# Patient Record
Sex: FEMALE | Race: WHITE | NOT HISPANIC OR LATINO | Employment: FULL TIME | URBAN - NONMETROPOLITAN AREA
[De-identification: names, ages, dates, MRNs, and addresses within clinical notes are randomized per-mention and may not be internally consistent; named-entity substitution may affect disease eponyms.]

---

## 2017-04-19 ENCOUNTER — OFFICE VISIT - GICH (OUTPATIENT)
Dept: FAMILY MEDICINE | Facility: OTHER | Age: 27
End: 2017-04-19

## 2017-04-19 ENCOUNTER — HISTORY (OUTPATIENT)
Dept: FAMILY MEDICINE | Facility: OTHER | Age: 27
End: 2017-04-19

## 2017-04-19 DIAGNOSIS — B97.89 OTHER VIRAL AGENTS AS THE CAUSE OF DISEASES CLASSIFIED ELSEWHERE: ICD-10-CM

## 2017-04-19 DIAGNOSIS — J06.9 ACUTE UPPER RESPIRATORY INFECTION: ICD-10-CM

## 2017-10-27 ENCOUNTER — OFFICE VISIT - GICH (OUTPATIENT)
Dept: CHIROPRACTIC MEDICINE | Facility: OTHER | Age: 27
End: 2017-10-27

## 2017-10-27 DIAGNOSIS — M25.552 PAIN IN LEFT HIP: ICD-10-CM

## 2017-10-27 DIAGNOSIS — M99.05 SEGMENTAL AND SOMATIC DYSFUNCTION OF PELVIC REGION: ICD-10-CM

## 2017-10-27 DIAGNOSIS — M54.50 LOW BACK PAIN: ICD-10-CM

## 2017-10-27 DIAGNOSIS — M99.01 SEGMENTAL AND SOMATIC DYSFUNCTION OF CERVICAL REGION: ICD-10-CM

## 2017-10-27 DIAGNOSIS — M54.2 CERVICALGIA: ICD-10-CM

## 2017-10-27 DIAGNOSIS — R42 DIZZINESS AND GIDDINESS: ICD-10-CM

## 2017-10-27 DIAGNOSIS — G44.209 TENSION-TYPE HEADACHE, NOT INTRACTABLE: ICD-10-CM

## 2017-10-27 DIAGNOSIS — M99.02 SEGMENTAL AND SOMATIC DYSFUNCTION OF THORACIC REGION: ICD-10-CM

## 2017-10-27 DIAGNOSIS — M54.6 PAIN IN THORACIC SPINE: ICD-10-CM

## 2017-11-02 ENCOUNTER — HISTORY (OUTPATIENT)
Dept: EMERGENCY MEDICINE | Facility: OTHER | Age: 27
End: 2017-11-02

## 2017-11-02 ENCOUNTER — OFFICE VISIT - GICH (OUTPATIENT)
Dept: CHIROPRACTIC MEDICINE | Facility: OTHER | Age: 27
End: 2017-11-02

## 2017-11-02 DIAGNOSIS — M21.42 ACQUIRED LEFT FLAT FOOT: ICD-10-CM

## 2017-11-02 DIAGNOSIS — M99.06 SEGMENTAL AND SOMATIC DYSFUNCTION OF LOWER EXTREMITY: ICD-10-CM

## 2017-11-02 DIAGNOSIS — M99.05 SEGMENTAL AND SOMATIC DYSFUNCTION OF PELVIC REGION: ICD-10-CM

## 2017-11-02 DIAGNOSIS — M54.6 PAIN IN THORACIC SPINE: ICD-10-CM

## 2017-11-02 DIAGNOSIS — M99.04 SEGMENTAL AND SOMATIC DYSFUNCTION OF SACRAL REGION: ICD-10-CM

## 2017-11-02 DIAGNOSIS — M54.50 LOW BACK PAIN: ICD-10-CM

## 2017-11-02 DIAGNOSIS — M21.41 ACQUIRED RIGHT FLAT FOOT: ICD-10-CM

## 2017-11-02 DIAGNOSIS — M99.02 SEGMENTAL AND SOMATIC DYSFUNCTION OF THORACIC REGION: ICD-10-CM

## 2017-11-02 DIAGNOSIS — M25.552 PAIN IN LEFT HIP: ICD-10-CM

## 2017-11-10 ENCOUNTER — OFFICE VISIT - GICH (OUTPATIENT)
Dept: CHIROPRACTIC MEDICINE | Facility: OTHER | Age: 27
End: 2017-11-10

## 2017-11-10 DIAGNOSIS — M54.6 PAIN IN THORACIC SPINE: ICD-10-CM

## 2017-11-10 DIAGNOSIS — M54.50 LOW BACK PAIN: ICD-10-CM

## 2017-11-10 DIAGNOSIS — M25.552 PAIN IN LEFT HIP: ICD-10-CM

## 2017-11-10 DIAGNOSIS — M99.05 SEGMENTAL AND SOMATIC DYSFUNCTION OF PELVIC REGION: ICD-10-CM

## 2017-11-10 DIAGNOSIS — M21.42 ACQUIRED LEFT FLAT FOOT: ICD-10-CM

## 2017-11-10 DIAGNOSIS — M99.02 SEGMENTAL AND SOMATIC DYSFUNCTION OF THORACIC REGION: ICD-10-CM

## 2017-11-10 DIAGNOSIS — M99.01 SEGMENTAL AND SOMATIC DYSFUNCTION OF CERVICAL REGION: ICD-10-CM

## 2017-11-10 DIAGNOSIS — M54.2 CERVICALGIA: ICD-10-CM

## 2017-11-10 DIAGNOSIS — M21.41 ACQUIRED RIGHT FLAT FOOT: ICD-10-CM

## 2017-11-10 DIAGNOSIS — M99.06 SEGMENTAL AND SOMATIC DYSFUNCTION OF LOWER EXTREMITY: ICD-10-CM

## 2017-11-10 DIAGNOSIS — M99.04 SEGMENTAL AND SOMATIC DYSFUNCTION OF SACRAL REGION: ICD-10-CM

## 2017-11-13 ENCOUNTER — OFFICE VISIT - GICH (OUTPATIENT)
Dept: CHIROPRACTIC MEDICINE | Facility: OTHER | Age: 27
End: 2017-11-13

## 2017-11-13 DIAGNOSIS — M54.2 CERVICALGIA: ICD-10-CM

## 2017-11-13 DIAGNOSIS — M54.50 LOW BACK PAIN: ICD-10-CM

## 2017-11-13 DIAGNOSIS — M25.552 PAIN IN LEFT HIP: ICD-10-CM

## 2017-11-13 DIAGNOSIS — M99.01 SEGMENTAL AND SOMATIC DYSFUNCTION OF CERVICAL REGION: ICD-10-CM

## 2017-11-13 DIAGNOSIS — M21.41 ACQUIRED RIGHT FLAT FOOT: ICD-10-CM

## 2017-11-13 DIAGNOSIS — M79.671 PAIN OF RIGHT FOOT: ICD-10-CM

## 2017-11-13 DIAGNOSIS — M54.6 PAIN IN THORACIC SPINE: ICD-10-CM

## 2017-11-13 DIAGNOSIS — M79.672 PAIN OF LEFT FOOT: ICD-10-CM

## 2017-11-13 DIAGNOSIS — M99.05 SEGMENTAL AND SOMATIC DYSFUNCTION OF PELVIC REGION: ICD-10-CM

## 2017-11-13 DIAGNOSIS — M99.02 SEGMENTAL AND SOMATIC DYSFUNCTION OF THORACIC REGION: ICD-10-CM

## 2017-11-13 DIAGNOSIS — M21.42 ACQUIRED LEFT FLAT FOOT: ICD-10-CM

## 2017-11-13 DIAGNOSIS — M99.06 SEGMENTAL AND SOMATIC DYSFUNCTION OF LOWER EXTREMITY: ICD-10-CM

## 2017-11-16 ENCOUNTER — OFFICE VISIT - GICH (OUTPATIENT)
Dept: CHIROPRACTIC MEDICINE | Facility: OTHER | Age: 27
End: 2017-11-16

## 2017-11-16 DIAGNOSIS — M21.41 ACQUIRED RIGHT FLAT FOOT: ICD-10-CM

## 2017-11-16 DIAGNOSIS — M99.06 SEGMENTAL AND SOMATIC DYSFUNCTION OF LOWER EXTREMITY: ICD-10-CM

## 2017-11-16 DIAGNOSIS — M54.50 LOW BACK PAIN: ICD-10-CM

## 2017-11-16 DIAGNOSIS — G44.209 TENSION-TYPE HEADACHE, NOT INTRACTABLE: ICD-10-CM

## 2017-11-16 DIAGNOSIS — M54.6 PAIN IN THORACIC SPINE: ICD-10-CM

## 2017-11-16 DIAGNOSIS — M79.672 PAIN OF LEFT FOOT: ICD-10-CM

## 2017-11-16 DIAGNOSIS — M54.2 CERVICALGIA: ICD-10-CM

## 2017-11-16 DIAGNOSIS — M99.05 SEGMENTAL AND SOMATIC DYSFUNCTION OF PELVIC REGION: ICD-10-CM

## 2017-11-16 DIAGNOSIS — M99.02 SEGMENTAL AND SOMATIC DYSFUNCTION OF THORACIC REGION: ICD-10-CM

## 2017-11-16 DIAGNOSIS — M25.552 PAIN IN LEFT HIP: ICD-10-CM

## 2017-11-16 DIAGNOSIS — M79.671 PAIN OF RIGHT FOOT: ICD-10-CM

## 2017-11-16 DIAGNOSIS — M21.42 ACQUIRED LEFT FLAT FOOT: ICD-10-CM

## 2017-11-16 DIAGNOSIS — M99.01 SEGMENTAL AND SOMATIC DYSFUNCTION OF CERVICAL REGION: ICD-10-CM

## 2017-11-21 ENCOUNTER — OFFICE VISIT - GICH (OUTPATIENT)
Dept: CHIROPRACTIC MEDICINE | Facility: OTHER | Age: 27
End: 2017-11-21

## 2017-11-21 DIAGNOSIS — M99.05 SEGMENTAL AND SOMATIC DYSFUNCTION OF PELVIC REGION: ICD-10-CM

## 2017-11-21 DIAGNOSIS — M21.41 ACQUIRED RIGHT FLAT FOOT: ICD-10-CM

## 2017-11-21 DIAGNOSIS — M99.01 SEGMENTAL AND SOMATIC DYSFUNCTION OF CERVICAL REGION: ICD-10-CM

## 2017-11-21 DIAGNOSIS — M99.02 SEGMENTAL AND SOMATIC DYSFUNCTION OF THORACIC REGION: ICD-10-CM

## 2017-11-21 DIAGNOSIS — M54.2 CERVICALGIA: ICD-10-CM

## 2017-11-21 DIAGNOSIS — M79.672 PAIN OF LEFT FOOT: ICD-10-CM

## 2017-11-21 DIAGNOSIS — G44.209 TENSION-TYPE HEADACHE, NOT INTRACTABLE: ICD-10-CM

## 2017-11-21 DIAGNOSIS — M79.671 PAIN OF RIGHT FOOT: ICD-10-CM

## 2017-11-21 DIAGNOSIS — M54.6 PAIN IN THORACIC SPINE: ICD-10-CM

## 2017-11-21 DIAGNOSIS — M99.06 SEGMENTAL AND SOMATIC DYSFUNCTION OF LOWER EXTREMITY: ICD-10-CM

## 2017-11-21 DIAGNOSIS — M21.42 ACQUIRED LEFT FLAT FOOT: ICD-10-CM

## 2017-11-21 DIAGNOSIS — M54.50 LOW BACK PAIN: ICD-10-CM

## 2017-11-21 DIAGNOSIS — M25.552 PAIN IN LEFT HIP: ICD-10-CM

## 2017-11-30 ENCOUNTER — OFFICE VISIT - GICH (OUTPATIENT)
Dept: CHIROPRACTIC MEDICINE | Facility: OTHER | Age: 27
End: 2017-11-30

## 2017-11-30 DIAGNOSIS — M99.05 SEGMENTAL AND SOMATIC DYSFUNCTION OF PELVIC REGION: ICD-10-CM

## 2017-11-30 DIAGNOSIS — M62.838 OTHER MUSCLE SPASM: ICD-10-CM

## 2017-11-30 DIAGNOSIS — M79.671 PAIN OF RIGHT FOOT: ICD-10-CM

## 2017-11-30 DIAGNOSIS — M99.01 SEGMENTAL AND SOMATIC DYSFUNCTION OF CERVICAL REGION: ICD-10-CM

## 2017-11-30 DIAGNOSIS — M25.552 PAIN IN LEFT HIP: ICD-10-CM

## 2017-11-30 DIAGNOSIS — M99.02 SEGMENTAL AND SOMATIC DYSFUNCTION OF THORACIC REGION: ICD-10-CM

## 2017-11-30 DIAGNOSIS — M21.42 ACQUIRED LEFT FLAT FOOT: ICD-10-CM

## 2017-11-30 DIAGNOSIS — M54.6 PAIN IN THORACIC SPINE: ICD-10-CM

## 2017-11-30 DIAGNOSIS — M54.50 LOW BACK PAIN: ICD-10-CM

## 2017-11-30 DIAGNOSIS — M21.41 ACQUIRED RIGHT FLAT FOOT: ICD-10-CM

## 2017-11-30 DIAGNOSIS — M99.06 SEGMENTAL AND SOMATIC DYSFUNCTION OF LOWER EXTREMITY: ICD-10-CM

## 2017-11-30 DIAGNOSIS — M79.672 PAIN OF LEFT FOOT: ICD-10-CM

## 2017-12-27 NOTE — PROGRESS NOTES
Patient Information     Patient Name MRN Sex Shaista Milan [id number] Female 1990      Progress Notes by Jeanette Gallardo DC at 2017 10:30 AM     Author:  Jeanette Gallardo DC Service:  (none) Author Type:  [facility]- Other provider     Filed:  2017  1:26 PM Encounter Date:  2017 Status:  Signed     :  Jeanette Gallardo DC ([facility]- Other provider)            PATIENT:  Shaista Esqueda is a 27 y.o. female    PROBLEM:   Date of Initial Visit for this Episode: 10/27/2017   Chief Complaint     Patient presents with       Chiropractic Care      f/u    Visit #2/5-8  2017      SUBJECTIVE:  In ED last night, lower abdominal pain, dx'd Bladder infection and feeling better after starting cipro.    Neck feels great, no dizziness.    Mid to low back sore, L hip pain. Rates pain 4 out of 10.  Bilateral plantar fascitis.  Planning to get new shoes. She has stretched her lower leg and rolled her arches over frozen water bottles, but has not been doing this consistently for some time. Wonders if chiropractic care in help with this problem.    Denies spinal pain or fever.      10/27/17 initial history of present illness: Frequent tension headaches from forehead around to suboccipital area on both sides. Increased motion sickness and dizziness yesterday. Not provoked by certain head position.  Denies fever, malaise, recent cold or viral illness.  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   Neck pain affecting sleeping.  She could use new pillows.  Rates pain 6/7-9/10 at worst.  Tried ibuprofen with some relief.    Second complaint is lower mid back pain described as frequent dull aching and tension. Rates pain 6-7 out of 10. Aggravating sleeping and tolerance to work activities moderately.     Lastly is chronic mild Left hip pain, locating in the hip joint laterally and left sacroiliac iliac and gluteal area. Pain is dull  aching to occasionally sharp. Worse with standing at length on concrete and occasionally bothers her sleeping.   She thinks she could use new supportive shoes. She has been doing less exercise and core strengthening that has also been helpful in the past.  Last care was 6 visits from May-June in 2016.      OBJECTIVE:  Overpronation both feet.     11/2/2017   Cervical Not assessed today. No complaint  Glute weakness  +Leg length inequality: +Derefield L; Restricted R heel to buttock   +Tenderness and +Muscle spasm: moderate suboccipitals, CT/parascapular, mid-lower thoracic, lumbosacral paraspinals.   +Joint asymmetry and restriction: , T4 L, T8 R, JIMBO, RLI.     ASSESSMENT:  Shaista Esqueda is a 25 y.o. female whose complaints are due to weakness and postural dysfunction.       ICD-10-CM    1. Acute bilateral thoracic back pain M54.6 [place] THERAPEUTIC EXERCISES EA 15 MIN      [place] CHIRO SPINAL MANIP 3-4 REGIONS GICH ONLY   2. Segmental and somatic dysfunction of thoracic region M99.02 [place] THERAPEUTIC EXERCISES EA 15 MIN      [place] CHIRO SPINAL MANIP 3-4 REGIONS [facility]ICH ONLY   3. Acute bilateral low back pain without sciatica M54.5 [place] THERAPEUTIC EXERCISES EA 15 MIN      [place] CHIRO SPINAL MANIP 3-4 REGIONS [facility]ICH ONLY   4. Left hip pain M25.552 [place] THERAPEUTIC EXERCISES EA 15 MIN      [place] CHIRO SPINAL MANIP 3-4 REGIONS GICH ONLY   5. Segmental and somatic dysfunction of sacral region M99.04 [place] THERAPEUTIC EXERCISES EA 15 MIN      [place] CHIRO SPINAL MANIP 3-4 REGIONS GICH ONLY   6. Segmental and somatic dysfunction of pelvic region M99.05 [place] THERAPEUTIC EXERCISES EA 15 MIN      [place] CHIRO SPINAL MANIP 3-4 REGIONS GICH ONLY   7. Bilateral pes planus M21.41 [place] THERAPEUTIC EXERCISES EA 15 MIN     M21.42 [place] CHIRO SPINAL MANIP 3-4 REGIONS [facility]ICH ONLY   8. Segmental and somatic dysfunction of lower extremity M99.06 [place] THERAPEUTIC EXERCISES EA 15 MIN      [place] CHIRO SPINAL MANIP 3-4 REGIONS [facility]ICH ONLY       PLAN  Care:  Spinal Adjustments to noted  levels using Diversified , prone thoracic,  Drop, LE LAD pelvic technique.  Will further evaluate lower extremities at next visit and consider adjusting Lower extremity at ankle and foot using Sugar protocol.  10600 Therapeutic Exercises 15 minutes spent with patient one on one to develop strength and endurance, range of motion and flexibility.  Patient able to demonstrate: arch, calf stretching. Glute strengthening.  Advised on footwear for Overpronation.    Patient Response: improved back and hip tension and pain    INSTRUCTIONS   Read handouts on self-care for Overpronation and Plantar Fascitis.  Daily arch, calf stretching.   Roll arches over frozen water bottle in evening.   Glute strengthening- hip bridges (extension).  Advised on footwear for Overpronation.    Return 3-4 days.  Progress exercises.        10/27/2017 Plan of Care: 11/2/2017 Updated with Lower extremity care to bi-weekly for up to 4 weeks  Spinal Adjustments, Therapeutic Exercise Activities, and/or Therapeutic Modalities to meet Treatment Goals.     Frequency: 2xweek up to 4 weeks.    Re-eval in 2 weeks approximately 11/13/17.  POC Discussed and Pt. Agreeable to plan of care.      10/27/2017 Neck index 34% Back index 20%  See outcome assessment tools/indices scanned in chart.     Goals:  Reduce pain and improve Activities of Daily living measured by 10-30% improved index scores  Improve sleep and tolerance to reading, standing/walking by 1 point on indices.

## 2017-12-27 NOTE — PROGRESS NOTES
Patient Information     Patient Name MRN Sex Shaista Milan [id number] Female 1990      Progress Notes by Jeanette Gallardo DC at 11/10/2017  3:00 PM     Author:  Jeanette Gallardo DC Service:  (none) Author Type:  [facility]- Other provider     Filed:  11/10/2017  5:05 PM Encounter Date:  11/10/2017 Status:  Signed     :  Jeanette Gallardo DC ([facility]- Other provider)            PATIENT:  Shaista Esqueda is a 27 y.o. female    PROBLEM:   Date of Initial Visit for this Episode: 10/27/2017   Chief Complaint     Patient presents with       Chiropractic Care      f/u & eval feet    Visit #3/5-8  11/10/2017      SUBJECTIVE:    Neck, Mid to low back sore, L hip pain gradually improving.  L>R bilateral plantar fascitis.  Planning to get new shoes. She has stretched her lower leg and rolled her arches over frozen water bottles once since last visit.      10/27/17 initial history of present illness: Frequent tension headaches from forehead around to suboccipital area on both sides. Increased motion sickness and dizziness yesterday. Not provoked by certain head position.  Denies fever, malaise, recent cold or viral illness.  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   Neck pain affecting sleeping.  She could use new pillows.  Rates pain 6/7-9/10 at worst.  Tried ibuprofen with some relief.    Second complaint is lower mid back pain described as frequent dull aching and tension. Rates pain 6-7 out of 10. Aggravating sleeping and tolerance to work activities moderately.     Lastly is chronic mild Left hip pain, locating in the hip joint laterally and left sacroiliac iliac and gluteal area. Pain is dull aching to occasionally sharp. Worse with standing at length on concrete and occasionally bothers her sleeping.   She thinks she could use new supportive shoes. She has been doing less exercise and core strengthening that has also been helpful  in the past.  Last care was 6 visits from May-June in 2016.      OBJECTIVE:  Overpronation both feet.     11/10/2017    Lower Extremity musculoskeletal exam:   Normal pulses, skin appearance  ROM: restricted dorsiflexion on L with L dorsal surface pain  +Bilateral Glute weakness +bilateral dysfunction Lower extremity at ankle and foot using Sugar protocol.  Tenderness, fascitis and adhesions from heel attachment toward longitudinal arch.  Increased mobility and crepitus L ankle, but stable.      straight leg-raise: bilateral hamstrings to 60 degrees  +Tenderness and +Muscle spasm: moderate suboccipitals, CT/parascapular, mid-lower thoracic, lumbosacral paraspinals.   +Joint asymmetry and restriction: C1 L, C4 R, T4 L, T8 R, JIMBO, RLI. +bilateral dysfunction Lower extremity at ankle and foot    ASSESSMENT:        ICD-10-CM    1. Acute bilateral thoracic back pain M54.6 [place] CHIRO SPINAL MANIP 3-4 REGIONS [facility] ONLY   2. Segmental and somatic dysfunction of thoracic region M99.02 [place] CHIRO SPINAL MANIP 3-4 REG[facility] ONLY   3. Acute bilateral low back pain without sciatica M54.5 [place] CHIRO SPINAL MANIP 3-4 REGIONS [facility]ICH ONLY   4. Left hip pain M25.552 [place] CHIRO SPINAL MANIP 3-4 REGIONS [facility] ONLY   5. Segmental and somatic dysfunction of sacral region M99.04 [place] CHIRO SPINAL MANIP 3-4 REGIONS [facility]ICH ONLY   6. Segmental and somatic dysfunction of pelvic region M99.05 [place] CHIRO EXTRASPINAL MANIP 1 OR MORE REGIONS GICH ONLY   7. Bilateral pes planus M21.41 [place] CHIRO EXTRASPINAL MANIP 1 OR MORE REGIONS GICH ONLY     M21.42    8. Segmental and somatic dysfunction of lower extremity M99.06 [place] CHIRO SPINAL MANIP 3-4 REGIONS [facility] ONLY   9. Neck pain M54.2 [place] CHIRO SPINAL MANIP 3-4 REGIONS [facility]ICH ONLY   10. Segmental and somatic dysfunction of cervical region M99.01 [place] CHIRO SPINAL MANIP 3-4 REGIONS [facility]ICH ONLY       PLAN  Care:  Spinal Adjustments to noted levels using Diversified , prone thoracic,  Drop, LE and LAD pelvic  "technique.  adjusting Lower extremity at ankle and foot using Sugar protocol.  Myofascial release: bilateral plantar fascia 2-3\"    Patient Response: improved foot, neck, back and hip pain, 5-10 degrees gain on SLR bilateral    INSTRUCTIONS   Read handouts on self-care for Overpronation and Plantar Fascitis.  Daily arch, calf stretching.   Roll arches over frozen water bottle in evening.   Glute strengthening- hip bridges (extension).  Advised on footwear for Overpronation.    Return 3-4 days.  ADD lower extremity to care plan.    Progress exercises.        10/27/2017 Plan of Care: 11/2/2017 Updated with Lower extremity care to bi-weekly for up to 4 weeks  Spinal Adjustments, Therapeutic Exercise Activities, and/or Therapeutic Modalities to meet Treatment Goals.     Frequency: 2xweek up to 4 weeks.    Re-eval 11/27/17.  POC Discussed and Pt. Agreeable to plan of care.      10/27/2017 Neck index 34% Back index 20%  See outcome assessment tools/indices scanned in chart.     Goals:  Reduce pain and improve Activities of Daily living measured by 10-30% improved index scores  Improve sleep and tolerance to reading, standing/walking by 1 point on indices.        "

## 2017-12-27 NOTE — PROGRESS NOTES
Patient Information     Patient Name MRN Sex Shaista Milan [id number] Female 1990      Progress Notes by Jeanette Gallardo DC at 2017  1:30 PM     Author:  Jeanette Gallardo DC Service:  (none) Author Type:  [facility]- Other provider     Filed:  2017  2:07 PM Encounter Date:  2017 Status:  Signed     :  Jeanette Gallardo DC ([facility]- Other provider)            PATIENT:  Shaista Esqueda is a 27 y.o. female    PROBLEM:   Date of Initial Visit for this Episode: 10/27/2017   Chief Complaint     Patient presents with       Chiropractic Care      f/u    Visit #7/5-8  2017      SUBJECTIVE:    Neck pain mild with reading. Headaches have resolved with care  Mid to low back and L hip pain improved. Stretching frequently.  Bottoms of both feet are doing better, not sore unless a busy workday.   Wearing an OTC ankle/arch support brace and finding helpful.    Sleeping better.  Rates pain 3 out of 10 average in past week and in last 24 hours 6 out of 10 because of busy work.       OBJECTIVE:  10/27 Initial exam:Overpronation both feet.      11/10/17 Foot/Ankle Exam  Lower Extremity musculoskeletal exam:   Normal pulses, skin appearance  ROM: restricted dorsiflexion on L with L dorsal surface pain  +Bilateral Glute weakness +bilateral dysfunction Lower extremity at ankle and foot using Sugar protocol.  Tenderness, fascitis and adhesions from heel attachment toward longitudinal arch.  Increased mobility and crepitus L ankle, but stable.  straight leg-raise: bilateral hamstrings to 60 degrees    2017     neck  range of motion within normal limits  low back mildly restricted with forward bending  +Tenderness and +Muscle spasm: Mild cervical, CT/parascapular and mid-lower thoracic improving lumbar paraspinals and right gluteal.   Foot and ankle ROM: unrestricted with out L dorsal surface pain on dorsiflexion  +Bilateral Glute weakness +bilateral dysfunction Lower extremity at  ankle and foot using Sugar protocol.  Bilateral Tenderness, fascitis and adhesions from heel attachment toward longitudinal arch. Fascial adhesions on Left worse than right.   Joint dysfunction on the right greater than left foot and ankle. Right navicular, cuboid and patellar joint dysfunction primary.    +Joint asymmetry and restriction: C1 L, C5 R,  T2 R, T4 L, T8 right, JIMBO, RLI. +bilateral dysfunction Lower extremity at ankle and foot    ASSESSMENT:        ICD-10-CM    1. Acute bilateral thoracic back pain M54.6 [place] CHIRO SPINAL MANIP 3-4 REGIONS [facility] ONLY   2. Segmental and somatic dysfunction of thoracic region M99.02 [place] CHIRO SPINAL MANIP 3-4 REGIONS [facility]ICH ONLY   3. Acute bilateral low back pain without sciatica M54.5 [place] CHIRO SPINAL MANIP 3-4 REGIONS [facility] ONLY   4. Left hip pain M25.552 [place] CHIRO SPINAL MANIP 3-4 REGIONS [facility] ONLY   5. Segmental and somatic dysfunction of pelvic region M99.05 [place] CHIRO SPINAL MANIP 3-4 REGIONS GICH ONLY   6. Foot arch pain, left M79.672 [place] CHIRO SPINAL MANIP 3-4 REGIONS [facility] ONLY      [place] CHIRO EXTRASPINAL MANIP 1 OR MORE REGIONS [facility]ICH ONLY   7. Foot arch pain, right M79.671 [place] CHIRO SPINAL MANIP 3-4 REGIONS [facility] ONLY      [place] CHIRO EXTRASPINAL MANIP 1 OR MORE REGIONS GICH ONLY   8. Segmental and somatic dysfunction of lower extremity M99.06 [place] CHIRO EXTRASPINAL MANIP 1 OR MORE REGIONS GICH ONLY   9. Bilateral pes planus M21.41 [place] CHIRO SPINAL MANIP 3-4 REGIONS [facility]ICH ONLY     M21.42 [place] CHIRO EXTRASPINAL MANIP 1 OR MORE REGIONS GICH ONLY   10. Segmental and somatic dysfunction of cervical region M99.01 [place] CHIRO SPINAL MANIP 3-4 REGIONS [facility]ICH ONLY   11. Spasm of muscle M62.838 [place] CHIRO SPINAL MANIP 3-4 REGIONS [facility]ICH ONLY       PLAN  Care:  Spinal Adjustments to noted levels using Diversified supine cervical, prone thoracic,  Drop, LE and LAD pelvic technique.  Bilateral adjusting Lower extremity at ankle and foot using Sugar protocol.  Myofascial release: bilateral plantar fascia  "3\"    Patient Response: improved, leg length equaled and hip abduction gluteal muscle facilitation speed improved post lower extremity care.            Plan of Care: 11/30/2017 return as needed    11/30/2017 Neck index 14% Back index 12%  10/27/2017 Neck index 34% Back index 20%  See outcome assessment tools/indices scanned in chart.     Goals:  Reduce pain and improve Activities of Daily living measured by 10-30% improved index scores  Improve sleep and tolerance to reading, standing/walking by 1 point on indices. 11/30/2017 All goals met.    INSTRUCTIONS   Continue home exercise program and self-care and return if worsening.     self-care for Overpronation and Plantar Fascitis.  Daily arch, calf stretching.   Roll arches over frozen water bottle in evening.   Glute strengthening- hip bridges (extension).  Advised on footwear for Overpronation   use your OTC ankle/arch support brace.          "

## 2017-12-27 NOTE — PROGRESS NOTES
Patient Information     Patient Name MRN Sex Shaista Milan [id number] Female 1990      Progress Notes by Jeanette Gallardo DC at 2017 10:00 AM     Author:  Jeanette Gallardo DC Service:  (none) Author Type:  [facility]- Other provider     Filed:  2017 11:02 AM Encounter Date:  2017 Status:  Signed     :  Jeanette Gallardo DC ([facility]- Other provider)            PATIENT:  Shaista Esqueda is a 27 y.o. female    PROBLEM:   Date of Initial Visit for this Episode: 10/27/2017   Chief Complaint     Patient presents with       Chiropractic Care      f/u    Visit #4/5-8  2017      SUBJECTIVE:    Neck pain and tension headache this morning. Applied peppermint essential oils that helped some.   Mid to low back sore, L hip pain gradually improving.    L>R bilateral plantar fascitis felt better after last visit.  Feet sore at 6 PM instead of buying  on her workdays  Has not got new shoes or OTC ankle/foot wrap. She has stretched her lower leg and rolled her arches over frozen water bottles.      10/27/17 initial history of present illness: Frequent tension headaches from forehead around to suboccipital area on both sides. Increased motion sickness and dizziness yesterday. Not provoked by certain head position.  Denies fever, malaise, recent cold or viral illness.  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   Neck pain affecting sleeping.  She could use new pillows.  Rates pain 6/7-9/10 at worst.  Tried ibuprofen with some relief.    Second complaint is lower mid back pain described as frequent dull aching and tension. Rates pain 6-7 out of 10. Aggravating sleeping and tolerance to work activities moderately.     Lastly is chronic mild Left hip pain, locating in the hip joint laterally and left sacroiliac iliac and gluteal area. Pain is dull aching to occasionally sharp. Worse with standing at length on concrete and  occasionally bothers her sleeping.   She thinks she could use new supportive shoes. She has been doing less exercise and core strengthening that has also been helpful in the past.  Last care was 6 visits from May-June in 2016.      OBJECTIVE:  10/27 Initial exam:Overpronation both feet.      11/10/17 Foot/Ankle Exam  Lower Extremity musculoskeletal exam:   Normal pulses, skin appearance  ROM: restricted dorsiflexion on L with L dorsal surface pain  +Bilateral Glute weakness +bilateral dysfunction Lower extremity at ankle and foot using Sugar protocol.  Tenderness, fascitis and adhesions from heel attachment toward longitudinal arch.  Increased mobility and crepitus L ankle, but stable.  straight leg-raise: bilateral hamstrings to 60 degrees    11/16/2017   +Tenderness and +Muscle spasm: moderate suboccipitals, CT/parascapular, mid-lower thoracic, lumbosacral paraspinals.   +Bilateral Glute weakness +bilateral dysfunction Lower extremity at ankle and foot using Sugar protocol.  Tenderness, fascitis and adhesions from heel attachment toward longitudinal arch. Improving    +Joint asymmetry and restriction: C1 L, C2 R, T4 L, T2 R, JIMBO, RLI. +bilateral dysfunction Lower extremity at ankle and foot    ASSESSMENT:        ICD-10-CM    1. Acute bilateral thoracic back pain M54.6 [place] CHIR[facility] SPINAL MANIP 3-4 REG[facility] ONLY   2. Segmental and somatic dysfunction of thoracic region M99.02 [place] CH[facility] SPINAL MANIP 3-4 REG[facility] ONLY   3. Acute bilateral low back pain without sciatica M54.5 [place] CH[facility] SPINAL MANIP 3-4 REG[facility] ONLY   4. Left hip pain M25.552 [place] CH[facility] SPINAL MANIP 3-4 REG[facility] ONLY   5. Segmental and somatic dysfunction of pelvic region M99.05 [place] CH[facility] SPINAL MANIP 3-4 REGIONS [facility] ONLY   6. Foot arch pain, left M79.672 [place] CHIRO EXTRASPINAL MANIP 1 OR MORE REGIONS [facility]ICH ONLY   7. Foot arch pain, right M79.671 [place] CH[facility] EXTRASPINAL MANIP 1 OR MORE REGIONS [facility]ICH ONLY   8. Segmental and somatic dysfunction  of lower extremity M99.06 [place] CH[facility]O EXTRASPINAL MANIP 1 OR MORE REGIONS GICH ONLY   9. Bilateral pes planus M21.41 [place] CH[facility] SPINAL MANIP 3-4 REGIONS [facility] ONLY     M21.42 [place] CH[facility] EXTRASPINAL MANIP 1 OR MORE REGIONS GICH ONLY   10. Neck pain M54.2 [place] CHIRO SPINAL MANIP 3-4 REGIONS [facility]ICH ONLY   11. Tension headache G44.209 [place] CHIRO SPINAL MANIP 3-4 REGIONS GICH ONLY   12. Segmental and somatic dysfunction of cervical region M99.01 [place] CH[facility] SPINAL MANIP 3-4 REGIONS GICH ONLY       PLAN  Care:  Spinal Adjustments to noted levels using Diversified supine cervical, prone thoracic, side posture , LE and LAD pelvic technique.  adjusting Lower extremity at ankle and foot using Sugar protocol.     Patient Response: improved     INSTRUCTIONS   Continue POC    Read handouts on self-care for Overpronation and Plantar Fascitis.  Daily arch, calf stretching.   Roll arches over frozen water bottle in evening.   Glute strengthening- hip bridges (extension).  Advised on footwear for Overpronation and OTC ankle/arch support brace.    Return 3-4 days.           10/27/2017 Plan of Care: 11/2/2017 Updated with Lower extremity care to bi-weekly for up to 4 weeks  Spinal Adjustments, Therapeutic Exercise Activities, and/or Therapeutic Modalities to meet Treatment Goals.     Frequency: 2xweek up to 4 weeks.    Re-eval 11/27/17.  POC Discussed and Pt. Agreeable to plan of care.      10/27/2017 Neck index 34% Back index 20%  See outcome assessment tools/indices scanned in chart.     Goals:  Reduce pain and improve Activities of Daily living measured by 10-30% improved index scores  Improve sleep and tolerance to reading, standing/walking by 1 point on indices.

## 2017-12-27 NOTE — PROGRESS NOTES
Patient Information     Patient Name MRN Sex Shaista Milan [id number] Female 1990      Progress Notes by Jeanette Gallardo DC at 2017  9:00 AM     Author:  Jeanette Gallardo DC Service:  (none) Author Type:  [facility]- Other provider     Filed:  2017 11:26 AM Encounter Date:  2017 Status:  Signed     :  Jeanette Gallardo DC ([facility]- Other provider)            PATIENT:  Shaista Esqueda is a 27 y.o. female    PROBLEM:   Date of Initial Visit for this Episode: 10/27/2017   Chief Complaint     Patient presents with       Chiropractic Care      f/u    Visit #4/5-8  2017      SUBJECTIVE:    Neck feels better sleeping and at work.  Mid to low back sore, L hip pain gradually improving.    L>R bilateral plantar fascitis felt better after last visit.  Feet sore from dancing bare foot later that night.    Has not got new shoes or OTC ankle/foot wrap. She has stretched her lower leg and rolled her arches over frozen water bottles.      10/27/17 initial history of present illness: Frequent tension headaches from forehead around to suboccipital area on both sides. Increased motion sickness and dizziness yesterday. Not provoked by certain head position.  Denies fever, malaise, recent cold or viral illness.  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   Neck pain affecting sleeping.  She could use new pillows.  Rates pain 6/7-9/10 at worst.  Tried ibuprofen with some relief.    Second complaint is lower mid back pain described as frequent dull aching and tension. Rates pain 6-7 out of 10. Aggravating sleeping and tolerance to work activities moderately.     Lastly is chronic mild Left hip pain, locating in the hip joint laterally and left sacroiliac iliac and gluteal area. Pain is dull aching to occasionally sharp. Worse with standing at length on concrete and occasionally bothers her sleeping.   She thinks she could use  new supportive shoes. She has been doing less exercise and core strengthening that has also been helpful in the past.  Last care was 6 visits from May-June in 2016.      OBJECTIVE:  10/27 Initial exam:Overpronation both feet.      11/10/17 Foot/Ankle Exam  Lower Extremity musculoskeletal exam:   Normal pulses, skin appearance  ROM: restricted dorsiflexion on L with L dorsal surface pain  +Bilateral Glute weakness +bilateral dysfunction Lower extremity at ankle and foot using Sugar protocol.  Tenderness, fascitis and adhesions from heel attachment toward longitudinal arch.  Increased mobility and crepitus L ankle, but stable.  straight leg-raise: bilateral hamstrings to 60 degrees    11/13/2017   +Tenderness and +Muscle spasm: moderate suboccipitals, CT/parascapular, mid-lower thoracic, lumbosacral paraspinals.   +Bilateral Glute weakness +bilateral dysfunction Lower extremity at ankle and foot using Sugar protocol.  Tenderness, fascitis and adhesions from heel attachment toward longitudinal arch.  +Joint asymmetry and restriction: C1 L, C4 R, T4 L, T8 R, JIMBO, RLI. +bilateral dysfunction Lower extremity at ankle and foot    ASSESSMENT:        ICD-10-CM    1. Acute bilateral thoracic back pain M54.6 OR CHIRO SPINAL MANIP 3-4 REGIONS GICH ONLY   2. Segmental and somatic dysfunction of thoracic region M99.02 OR CHIRO SPINAL MANIP 3-4 REGIONS GICH ONLY   3. Acute bilateral low back pain without sciatica M54.5 OR CHIRO SPINAL MANIP 3-4 REGIONS GICH ONLY   4. Left hip pain M25.552 OR CHIRO SPINAL MANIP 3-4 REGIONS GICH ONLY   5. Segmental and somatic dysfunction of pelvic region M99.05 OR CHIRO SPINAL MANIP 3-4 REGIONS GICH ONLY   6. Foot arch pain, left M79.672 OR CHIRO EXTRASPINAL MANIP 1 OR MORE REGIONS GICH ONLY   7. Foot arch pain, right M79.671 OR CHIRO EXTRASPINAL MANIP 1 OR MORE REGIONS GICH ONLY   8. Segmental and somatic dysfunction of lower extremity M99.06 OR CHIRO EXTRASPINAL MANIP 1 OR MORE REGIONS GICH  "ONLY   9. Bilateral pes planus M21.41 [place] CHIRO SPINAL MANIP 3-4 REGIONS GICH ONLY     M21.42 [place] CHIRO EXTRASPINAL MANIP 1 OR MORE REGIONS GICH ONLY   10. Neck pain M54.2 [place] CHIRO SPINAL MANIP 3-4 REGIONS GICH ONLY   11. Segmental and somatic dysfunction of cervical region M99.01 [place] CHIRO SPINAL MANIP 3-4 REGIONS GICH ONLY       PLAN  Care:  Spinal Adjustments to noted levels using Diversified supine cervical, prone thoracic, side posture , LE and LAD pelvic technique.  adjusting Lower extremity at ankle and foot using Sugar protocol.  Myofascial release: bilateral plantar fascia 2-3\"    Patient Response: improved foot, neck, back and hip pain, Resolved on dorsiflexion L dorsal foot pain    INSTRUCTIONS   Continue POC    Read handouts on self-care for Overpronation and Plantar Fascitis.  Daily arch, calf stretching.   Roll arches over frozen water bottle in evening.   Glute strengthening- hip bridges (extension).  Advised on footwear for Overpronation and OTC ankle/arch support brace.    Return 3-4 days.           10/27/2017 Plan of Care: 11/2/2017 Updated with Lower extremity care to bi-weekly for up to 4 weeks  Spinal Adjustments, Therapeutic Exercise Activities, and/or Therapeutic Modalities to meet Treatment Goals.     Frequency: 2xweek up to 4 weeks.    Re-eval 11/27/17.  POC Discussed and Pt. Agreeable to plan of care.      10/27/2017 Neck index 34% Back index 20%  See outcome assessment tools/indices scanned in chart.     Goals:  Reduce pain and improve Activities of Daily living measured by 10-30% improved index scores  Improve sleep and tolerance to reading, standing/walking by 1 point on indices.        "

## 2017-12-27 NOTE — PROGRESS NOTES
Patient Information     Patient Name MRN Sex Shaista Milan [id number] Female 1990      Progress Notes by Jeanette Gallardo DC at 2017  9:00 AM     Author:  Jeanette Gallardo DC Service:  (none) Author Type:  [facility]- Other provider     Filed:  2017  9:41 AM Encounter Date:  2017 Status:  Signed     :  Jeanette Gallardo DC ([facility]- Other provider)            PATIENT:  Shaista Esqueda is a 27 y.o. female    PROBLEM:   Date of Initial Visit for this Episode: 10/27/2017   Chief Complaint     Patient presents with       Chiropractic Care      f/u    Visit #5/5-8  2017      SUBJECTIVE:    Neck pain mild today but had a headache yesterday morning until noon.   Mid to low back sore, L hip pain gradually improving. Doing seated glutes stretching frequently.  Bottoms of both feet are doing better, not sore until at the end of her workday.       10/27/17 initial history of present illness: Frequent tension headaches from forehead around to suboccipital area on both sides. Increased motion sickness and dizziness yesterday. Not provoked by certain head position.  Denies fever, malaise, recent cold or viral illness.  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   Neck pain affecting sleeping.  She could use new pillows.  Rates pain 6/7-9/10 at worst.  Tried ibuprofen with some relief.    Second complaint is lower mid back pain described as frequent dull aching and tension. Rates pain 6-7 out of 10. Aggravating sleeping and tolerance to work activities moderately.     Lastly is chronic mild Left hip pain, locating in the hip joint laterally and left sacroiliac iliac and gluteal area. Pain is dull aching to occasionally sharp. Worse with standing at length on concrete and occasionally bothers her sleeping.   She thinks she could use new supportive shoes. She has been doing less exercise and core strengthening that has  also been helpful in the past.  Last care was 6 visits from May-June in 2016.      OBJECTIVE:  10/27 Initial exam:Overpronation both feet.      11/10/17 Foot/Ankle Exam  Lower Extremity musculoskeletal exam:   Normal pulses, skin appearance  ROM: restricted dorsiflexion on L with L dorsal surface pain  +Bilateral Glute weakness +bilateral dysfunction Lower extremity at ankle and foot using Sugar protocol.  Tenderness, fascitis and adhesions from heel attachment toward longitudinal arch.  Increased mobility and crepitus L ankle, but stable.  straight leg-raise: bilateral hamstrings to 60 degrees    11/21/2017     +Tenderness and +Muscle spasm: Mild suboccipitals, CT/parascapular and mid-lower thoracic improving, moderate lumbar paraspinals and right gluteal.     +Bilateral Glute weakness +bilateral dysfunction Lower extremity at ankle and foot using Sugar protocol.  Bilateral Tenderness, fascitis and adhesions from heel attachment toward longitudinal arch. Fascial adhesions on Left worse than right.   Joint dysfunction on the right greater than left foot and ankle. Right navicular, cuboid and patellar joint dysfunction primary.    +Joint asymmetry and restriction: C1 L, C5 R,  T2 R, T4 L, T8 right, JIMBO, RLI. +bilateral dysfunction Lower extremity at ankle and foot    ASSESSMENT:        ICD-10-CM    1. Acute bilateral thoracic back pain M54.6 [place] CHIRO SPINAL MANIP 3-4 REGIONS GICH ONLY   2. Segmental and somatic dysfunction of thoracic region M99.02 [place] CHIRO SPINAL MANIP 3-4 REGIONS GICH ONLY   3. Acute bilateral low back pain without sciatica M54.5 [place] CHIRO SPINAL MANIP 3-4 REGIONS GICH ONLY   4. Left hip pain M25.552 [place] CHIRO SPINAL MANIP 3-4 REGIONS GICH ONLY   5. Segmental and somatic dysfunction of pelvic region M99.05 [place] CHIRO SPINAL MANIP 3-4 REGIONS GICH ONLY   6. Foot arch pain, left M79.672 [place] CHIRO EXTRASPINAL MANIP 1 OR MORE REGIONS GICH ONLY   7. Foot arch pain, right M79.671 [place] CHIRO EXTRASPINAL  "MANIP 1 OR MORE REGIONS GICH ONLY   8. Segmental and somatic dysfunction of lower extremity M99.06 [place] CHIRO EXTRASPINAL MANIP 1 OR MORE REGIONS GICH ONLY   9. Bilateral pes planus M21.41 [place] CHIRO SPINAL MANIP 3-4 REGIONS GICH ONLY     M21.42    10. Neck pain M54.2 [place] CHIRO SPINAL MANIP 3-4 REGIONS GICH ONLY   11. Tension headache G44.209 [place] CHIRO SPINAL MANIP 3-4 REGIONS GICH ONLY   12. Segmental and somatic dysfunction of cervical region M99.01 [place] CHIRO SPINAL MANIP 3-4 REGIONS GICH ONLY       PLAN  Care:  Spinal Adjustments to noted levels using Diversified supine cervical, prone thoracic,  Drop, LE and LAD pelvic technique.  Bilateral adjusting Lower extremity at ankle and foot using Sugar protocol.  Myofascial release: bilateral plantar fascia 3\"    Patient Response: improved, leg length equaled and hip abduction gluteal muscle facilitation speed improved post lower extremity care.    INSTRUCTIONS   Continue POC    self-care for Overpronation and Plantar Fascitis.  Daily arch, calf stretching.   Roll arches over frozen water bottle in evening.   Glute strengthening- hip bridges (extension).  Advised on footwear for Overpronation and OTC ankle/arch support brace.    Return 6-9 days.           10/27/2017 Plan of Care: 11/2/2017 Updated with Lower extremity care to bi-weekly for up to 4 weeks  Spinal Adjustments, Therapeutic Exercise Activities, and/or Therapeutic Modalities to meet Treatment Goals.     Frequency: 2xweek up to 4 weeks.    Re-eval 11/27/17.  POC Discussed and Pt. Agreeable to plan of care.      10/27/2017 Neck index 34% Back index 20%  See outcome assessment tools/indices scanned in chart.     Goals:  Reduce pain and improve Activities of Daily living measured by 10-30% improved index scores  Improve sleep and tolerance to reading, standing/walking by 1 point on indices.        "

## 2017-12-28 NOTE — PROGRESS NOTES
Patient Information     Patient Name MRN Sex Shaista Milan [id number] Female 1990      Progress Notes by Jeanette Gallardo DC at 10/27/2017 11:00 AM     Author:  Jeanette Gallardo DC Service:  (none) Author Type:  [facility]- Other provider     Filed:  2017  1:10 PM Encounter Date:  10/27/2017 Status:  Signed     :  Jeanette Gallardo DC ([facility]- Other provider)            PATIENT:  Shaista Esqueda is a 27 y.o. female    PROBLEM:   Date of Initial Visit for this Episode: 10/27/2017   Chief Complaint     Patient presents with       Neck Pain/problem      Headaches and dizziness.     Back Pain      thoracolumbar     Pelvis Pain/problem      L hip/SI    Visit #1/3-5  2017      SUBJECTIVE:  Frequent tension headaches from forehead around to suboccipital area on both sides. Increased motion sickness and dizziness yesterday. Not provoked by certain head position.  Denies fever, malaise, recent cold or viral illness.  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   Neck pain affecting sleeping.  She could use new pillows.  Rates pain 6/7-9/10 at worst.  Tried ibuprofen with some relief.    Second complaint is lower mid back pain described as frequent dull aching and tension. Rates pain 6-7 out of 10. Aggravating sleeping and tolerance to work activities moderately.     Lastly is chronic mild Left hip pain, locating in the hip joint laterally and left sacroiliac iliac and gluteal area. Pain is dull aching to occasionally sharp. Worse with standing at length on concrete and occasionally bothers her sleeping.   She thinks she could use new supportive shoes. She has been doing less exercise and core strengthening that has also been helpful in the past.  Last care was 6 visits from May- in 2016.      Patient Active Problem List     Diagnosis  Code     FAMILY HISTORY BREAST CANCER Z80.3     FAMILY HISTORY CORONARY HEART DISEASE MALE  < 55 Z82.49     TMJ SYNDROME M26.609     BCPS-SUBSEQUENT RX Z30.41     DYSMENORRHEA N94.6     STD TESTING Z30.2     CONTRACEPTIVE MANAGEMENT Z30.09        Medications and allergies reviewed in chart.      OBJECTIVE:  /92  Pulse 90  Temp 97.8  F (36.6  C) (Temporal)  Resp 18  Gen.: Pleasant appearance, no acute distress or malaise.  Eyes: Normal eye movement, no nystagmus    Musculoskeletal:  Posture: Mild forward head, anterior pelvic tilt, low right pelvis, low right shoulder, and left low occiput.  Negative VBI: No dizziness on neck extension or extension with lateral flexion to either side.    Overpronation both feet.   Gait: unremarkable.      Cervical ROM:  Restricted   -Maximal Foraminal Compression: +focal mild neck pain.   -Distraction improves     Thoracic  ROM:   +Kemps: T3-4, T7-8.       Lumbar/Pelvic:  ROM: normal  +Leg length inequality: +[place] L; Restricted R heel to buttock      +Tenderness and +Muscle spasm: moderate suboccipitals, CT/parascapular, mid-lower thoracic, lumbosacral paraspinals.   +Joint asymmetry and restriction: C1 L, C3 R, T4 L, T8 R, JIMBO, RLI.     ASSESSMENT:  Shaista Esqueda is a 25 y.o. female whose complaints are due to weakness and postural dysfunction.       ICD-10-CM    1. Tension headache G44.209 [place] CHIRO SPINAL MANIP 3-4 REGIONS GICH ONLY   2. Dizziness R42 [place] CHIRO SPINAL MANIP 3-4 REGIONS GICH ONLY   3. Neck pain M54.2 [place] CHIRO SPINAL MANIP 3-4 REGIONS GICH ONLY   4. Segmental and somatic dysfunction of cervical region M99.01 [place] CHIRO SPINAL MANIP 3-4 REGIONS GICH ONLY   5. Acute bilateral thoracic back pain M54.6 [place] CHIRO SPINAL MANIP 3-4 REGIONS GICH ONLY   6. Segmental and somatic dysfunction of thoracic region M99.02 [place] CHIRO SPINAL MANIP 3-4 REGIONS GICH ONLY   7. Left hip pain M25.552 [place] CHIRO SPINAL MANIP 3-4 REGIONS GICH ONLY   8. Acute bilateral low back pain without sciatica M54.5    9. Segmental and somatic dysfunction of pelvic region M99.05 [place]  CHIRO SPINAL MANIP 3-4 REGIONS GICH ONLY       PLAN  Care:  Spinal Adjustments to noted levels using Diversified supine cervical, prone thoracic,  Drop, LE LAD pelvic technique.  Patient Response: improved and back tension and pain    INSTRUCTIONS   Return 3-4 days.  Restart exercise routine.       10/27/2017 Plan of Care:   Spinal Adjustments, Therapeutic Exercise Activities, and/or Therapeutic Modalities to meet Treatment Goals.     Frequency: every 1-2 weeks up to 4 weeks.    Re-eval in 2 weeks approximately 11/13/17.  POC Discussed and Pt. Agreeable to plan of care.      10/27/2017 Neck index 34% Back index 20%  See outcome assessment tools/indices scanned in chart.     Goals:  Reduce pain and improve Activities of Daily living measured by 10-30% improved index scores  Improve sleep and tolerance to reading, standing/walking by 1 point on indices.

## 2017-12-28 NOTE — PATIENT INSTRUCTIONS
Patient Information     Patient Name MRN Sex Shaista Milan [id number] Female 1990      Patient Instructions by Jeanette Gallardo DC at 2017  9:00 AM     Author:  Jeanette Gallardo DC Service:  (none) Author Type:  [facility]- Other provider     Filed:  2017 11:25 AM Encounter Date:  2017 Status:  Signed     :  Jeanette Gallardo DC ([facility]- Other provider)              INSTRUCTIONS   Continue POC    Read handouts on self-care for Overpronation and Plantar Fascitis.  Daily arch, calf stretching.   Roll arches over frozen water bottle in evening.   Glute strengthening- hip bridges (extension).  Advised on footwear for Overpronation and OTC ankle/arch support brace.    Return 3-4 days.           10/27/2017 Plan of Care: 2017 Updated with Lower extremity care to bi-weekly for up to 4 weeks  Spinal Adjustments, Therapeutic Exercise Activities, and/or Therapeutic Modalities to meet Treatment Goals.     Frequency: 2xweek up to 4 weeks.    Re-eval 17.  POC Discussed and Pt. Agreeable to plan of care.      10/27/2017 Neck index 34% Back index 20%  See outcome assessment tools/indices scanned in chart.     Goals:  Reduce pain and improve Activities of Daily living measured by 10-30% improved index scores  Improve sleep and tolerance to reading, standing/walking by 1 point on indices.

## 2017-12-29 NOTE — PATIENT INSTRUCTIONS
Patient Information     Patient Name MRN Sex Shaista Milan [id number] Female 1990      Patient Instructions by Jeanette Gallardo DC at 2017 11:16 AM     Author:  Jeanette Gallardo DC  Service:  (none) Author Type:  [facility]- Other provider     Filed:  2017  1:22 PM  Encounter Date:  2017 Status:  Addendum     :  Jeanette Gallardo DC ([facility]- Other provider)        Related Notes: Original Note by Jeaentte Gallardo DC ([facility]- Other provider) filed at 2017 11:18 AM              PLAN  Care:  Spinal Adjustments to noted levels using Diversified supine cervical, prone thoracic,  Drop, LE LAD pelvic technique.  Will further evaluate lower extremities at next visit and consider adjusting Lower extremity at ankle and foot using Sugar protocol.  38681 Therapeutic Exercises 15 minutes spent with patient one on one to develop strength and endurance, range of motion and flexibility.  Patient able to demonstrate: arch, calf stretching. Glute strengthening.  Advised on footwear for Overpronation.    Patient Response: improved back and hip tension and pain    INSTRUCTIONS   Daily arch, calf stretching.   Roll arches over frozen water bottle in evening.   Glute strengthening- hip bridges (extension).  Advised on footwear for Overpronation.    Return 3-4 days.        10/27/2017 Plan of Care: 2017 Updated with Lower extremity care to bi-weekly for up to 4 weeks  Spinal Adjustments, Therapeutic Exercise Activities, and/or Therapeutic Modalities to meet Treatment Goals.     Frequency: 2xweek up to 4 weeks.    Re-eval in 2 weeks approximately 17.  POC Discussed and Pt. Agreeable to plan of care.      10/27/2017 Neck index 34% Back index 20%  See outcome assessment tools/indices scanned in chart.     Goals:  Reduce pain and improve Activities of Daily living measured by 10-30% improved index scores  Improve sleep and tolerance to reading, standing/walking by 1 point on  indices.       Index Related topics   Over-Pronation   ________________________________________________________________________  KEY POINTS    Over-pronation means that the arch of your foot flattens more than normal when you walk or run. Over-pronation can cause pain in your arch, heel, shin, ankle, knee, hip, or back.    It is treated with shoe inserts called arch supports or orthotics.  ________________________________________________________________________  What is over-pronation?  Normally, when you walk or run, your heel is the first part of your foot to strike the ground. As your body weight shifts to the middle of the foot, the arch of the foot naturally flattens out a bit. This flattening is called pronation. If your feet flatten more than normal, it s called over-pronation or flat feet. Over-pronation can cause many problems, such as an Achilles tendon injury or heel pain. It may lead to knee problems.  What is the cause?  Over-pronation may happen because the tissue that attaches to your foot bones is loose. You may be born with this problem or it may result from injuries or overuse, like from too much running.  What are the symptoms?  Over-pronation can cause pain in your arch, heel, shin, ankle, knee, hip, or back.  How is it diagnosed?  Your healthcare provider will ask about your symptoms, medical history, and activities and examine your feet. Your provider may:    Watch you walk or run    Check the motion of your feet when they strike the ground    Look at your athletic shoes to see if they show an abnormal pattern of wear  How is it treated?  Over-pronation and the problems that go with it are treated with shoe inserts called arch supports or orthotics. You can buy orthotics at a pharmacy or athletic shoe store or they can be custom made. Make sure the arch supports are firm. If you can easily bend them in half, they may be too flexible.  How can I take care of myself?  Follow your healthcare  provider's instructions. Ask your provider what symptoms or problems you should watch for and what to do if you have them.  How can I help prevent over-pronation?  Over-pronation is usually caused by a problem with your feet that you were born with. Wearing orthotics in your shoes may help prevent problems from over-pronation.  Developed by [employer]  Adult Advisor 2016.3 published by [employer]  Last modified: 2015-07-21  Last reviewed: 2015-06-29  This content is reviewed periodically and is subject to change as new health information becomes available. The information is intended to inform and educate and is not a replacement for medical evaluation, advice, diagnosis or treatment by a healthcare professional.  References   Adult Advisor 2016.3 Index    Copyright   2016 [employer] a division of [employer] All rights reserved.          Index Mongolian Exercises   Plantar Fasciitis   ________________________________________________________________________  KEY POINTS    Plantar fasciitis is painful irritation of the tissue on the bottom of your foot between the ball of the foot and the heel.    Change or stop doing the activities that cause pain until the injury heals.    Plantar fasciitis can be treated with shoe inserts, losing weight if you need to, exercise, ice, and sometimes with medicine.  ________________________________________________________________________  What is plantar fasciitis?   Plantar fasciitis is painful irritation of the tissue on the bottom of your foot between the ball of the foot and the heel. This tough tissue that supports the arch of your foot is called fascia.  What is the cause?  Plantar fasciitis can be caused by a number of things, like:    A lot of running, jumping, walking, or stair-climbing    Wearing high heels for long periods of time    Gaining weight  If you are a runner, you may get plantar fasciitis when you start running further during each workout or  if you run more often. It can also happen if you start running on a different surface or in different terrain, or if your shoes are worn out and don't give enough cushioning for your heels.  If you wear high-heeled shoes, including western-style boots, the fascia can get shorter. You may then have pain when you stretch the fascia. This painful stretching might happen, for example, when you walk barefoot after getting out of bed in the morning.  If you gain weight, you may put more stress on your feet, especially if you walk a lot or  shoes with poor cushioning.  If the arches of your foot are unusually high or low, you are more likely to develop plantar fasciitis than if your arches are normal.  What are the symptoms?   The main symptom of plantar fasciitis is heel pain when you walk. You may also feel pain when you stand and possibly even when you are resting. This pain typically occurs first thing in the morning when you get out of bed and put your foot flat on the floor, stretching the fascia. The pain may lessen after you have been up for a while and walking, but then the pain may come back after you have been resting.  You may not have any pain when you are sleeping because the position of your feet during rest allows the fascia to shorten and relax.  How is it diagnosed?   Your healthcare provider will ask about your symptoms, activities, and medical history and examine your foot and ankle. You may have an X-ray or scan of your heel.  How is it treated?   Give your painful heel lots of rest. You will need to change or stop doing the activities that cause pain until your foot has healed. You may need to stay completely off your foot for several days when the pain is severe.  Your healthcare provider may recommend stretching and strengthening exercises or physical therapy to help you heal. Exercises to make the foot stronger and to stretch the fascia are very important.  Your healthcare provider may  recommend shoe inserts called arch supports or orthotics. You can buy them at a pharmacy or athletic shoe store or they can be custom made. Make sure the arch supports are firm. If you can easily bend them in half, they may be too flexible. These supports can be particularly helpful if you have flat feet or high arches. Wearing athletic shoes or heel cushions in both shoes may also help. Cushions are most helpful if you are overweight or an older adult.  A night splint may help keep the plantar fascia stretched while you are sleeping.  Your provider may give you a shot of steroid medicine. Your healthcare provider may have other treatments to suggest.   With treatment, plantar fasciitis may take up to several months to heal. You may find that the pain comes and goes. Talk to your healthcare provider if you do not feel improvement with treatment.  How can I take care of myself?  To help relieve pain:    Rest your heel on an ice pack, gel pack, or package of frozen vegetables wrapped in a cloth every 3 to 4 hours for up to 20 minutes at a time.    Keep your foot up on a pillow when you sit or lie down.    Take nonprescription pain medicine, such as acetaminophen, ibuprofen, or naproxen. Read the label and take as directed. Unless recommended by your healthcare provider, you should not take these medicines for more than 10 days.    Nonsteroidal anti-inflammatory medicines (NSAIDs), such as ibuprofen, naproxen, and aspirin, may cause stomach bleeding and other problems. These risks increase with age.    Acetaminophen may cause liver damage or other problems. Unless recommended by your provider, don't take more than 3000 milligrams (mg) in 24 hours. To make sure you don t take too much, check other medicines you take to see if they also contain acetaminophen. Ask your provider if you need to avoid drinking alcohol while taking this medicine.  Follow your healthcare provider's instructions, including any exercises  recommended by your provider. Ask your provider:    How and when you will get your test results    How long it will take to recover    If there are activities you should avoid, including how much you can lift, and when you can return to your normal activities    How to take care of yourself at home    What symptoms or problems you should watch for and what to do if you have them  Make sure you know when you should come back for a checkup. Keep all appointments for provider visits or tests.  How can I help prevent plantar fasciitis?   The best way to prevent plantar fasciitis is to wear well-made shoes that fit your feet and give good arch support and cushioning. This is especially important if you exercise or walk a lot or stand for a long time on hard surfaces. Get new athletic shoes before your old shoes stop supporting and cushioning your feet.  You should also:    Avoid repeated jarring to the heel.    Keep a healthy weight.    Do leg and foot stretching exercises regularly.  Developed by [employer].  Adult Advisor 2016.3 published by [employer].  Last modified: 2016-03-23  Last reviewed: 2015-06-29  This content is reviewed periodically and is subject to change as new health information becomes available. The information is intended to inform and educate and is not a replacement for medical evaluation, advice, diagnosis or treatment by a healthcare professional.  References   Adult Advisor 2016.3 Index    Copyright   2016 [employer], a division of [employer] All rights reserved.          Index Ethiopian   Plantar Fasciitis Exercises   Your healthcare provider may recommend exercises to help you heal. Talk to your healthcare provider or physical therapist about which exercises will best help you and how to do them correctly and safely. An over-the-counter shoe orthotic might also be helpful. You can buy these at an outdoor recreation store, shoe store, or large department store.  You may start  strengthening the muscles of your hip and stretching the muscles of your foot right away.    Prone hip extension: Lie on your stomach with your legs straight out behind you. Fold your arms under your head and rest your head on your arms. Draw your belly button in towards your spine and tighten your abdominal muscles. Tighten the buttocks and thigh muscles of the leg on your injured side and lift the leg off the floor about 8 inches. Keep your leg straight. Hold for 5 seconds. Then lower your leg and relax. Do 2 sets of 15.    Side-lying leg lift: Lie on your uninjured side. Tighten the front thigh muscles on your injured leg and lift that leg 8 to 10 inches (20 to 25 centimeters) away from the other leg. Keep the leg straight and lower it slowly. Do 2 sets of 15.    Frozen can roll: Roll your bare injured foot back and forth from your heel to your mid-arch over a frozen juice can. Repeat for 3 to 5 minutes. This exercise is particularly helpful if it is done first thing in the morning.    Towel stretch: Sit on a hard surface with your injured leg stretched out in front of you. Loop a towel around your toes and the ball of your foot and pull the towel toward your body keeping your leg straight. Hold this position for 15 to 30 seconds and then relax. Repeat 3 times.    Standing calf stretch: Stand facing a wall with your hands on the wall at about eye level. Keep your injured leg back with your heel on the floor. Keep the other leg forward with the knee bent. Turn your back foot slightly inward (as if you were pigeon-toed). Slowly lean into the wall until you feel a stretch in the back of your calf. Hold the stretch for 15 to 30 seconds. Return to the starting position. Repeat 3 times. Do this exercise several times each day.    Seated plantar fascia stretch: Sit in a chair and cross the injured foot over the knee of your other leg. Place your fingers over the base of your toes and pull them back toward your shin  until you feel a comfortable stretch in the arch of your foot. Hold 15 seconds and repeat 3 times.    Plantar fascia massage: Sit in a chair and cross the injured foot over the knee of your other leg. Place your fingers over the base of the toes of your injured foot and pull your toes toward your shin until you feel a stretch in the arch of your foot. With your other hand, massage the bottom of your foot, moving from the heel toward your toes. Do this for 3 to 5 minutes. Start gently. Press harder on the bottom of your foot as you become able to tolerate more pressure.    Achilles stretch: Stand with the ball of one foot on a stair. Reach for the step below with your heel until you feel a stretch in the arch of your foot. Hold this position for 15 to 30 seconds and then relax. Repeat 3 times.    Standing hamstring stretch: Put the heel of the leg on your injured side on a stool about 15 inches high. Keep your leg straight. Lean forward, bending at the hips, until you feel a mild stretch in the back of your thigh. Make sure you don't roll your shoulders or bend at the waist when doing this or you will stretch your lower back instead of your leg. Hold the stretch for 15 to 30 seconds. Repeat 3 times.  After you have stretched the bottom muscles of your foot, you can do these exercises to start strengthening the muscles of your foot.    Towel pickup: With your heel on the ground,  a towel with your toes. Release. Do 3 sets of 20. When this gets easy, add more resistance by placing a book or small weight on the towel.    Balance and reach exercises: Stand next to a chair with your injured leg farther from the chair. The chair will provide support if you need it. Stand on the foot of your injured leg and bend your knee slightly.  1. With the hand that is farther away from the chair, reach forward in front of you by bending at the waist. Avoid bending your knee any more as you do this. Repeat this 15 times. To make  the exercise more challenging, reach farther in front of you. Do 2 sets of 15.  2. Reach the hand that is farther away from the chair across your body toward the chair. The farther you reach, the more challenging the exercise. Do 2 sets of 15.    Heel raise: Stand behind a chair or counter with both feet flat on the floor. Using the chair or counter as a support, rise up onto your toes and hold for 5 seconds. Then slowly lower yourself down without holding onto the support. (It's OK to keep holding onto the support if you need to.) When this exercise becomes less painful, try doing this exercise while you are standing on the injured leg only. Repeat 15 times. Do 2 sets of 15. Rest 30 seconds between sets.  Developed by [employer].  Adult Advisor 2016.3 published by [employer].  Last modified: 2016-05-25  Last reviewed: 2016-05-25  This content is reviewed periodically and is subject to change as new health information becomes available. The information is intended to inform and educate and is not a replacement for medical evaluation, advice, diagnosis or treatment by a healthcare professional.  References   Adult Advisor 2016.3 Index    Copyright   2016 [employer], a division of [employer] All rights reserved.

## 2017-12-29 NOTE — PATIENT INSTRUCTIONS
Patient Information     Patient Name MRN Sex Shaista Milan [id number] Female 1990      Patient Instructions by Jeanette Gallardo DC at 11/10/2017  3:00 PM     Author:  Jeanette Gallardo DC Service:  (none) Author Type:  [facility]- Other provider     Filed:  11/10/2017  5:03 PM Encounter Date:  11/10/2017 Status:  Signed     :  Jeanette Gallardo DC ([facility]- Other provider)              INSTRUCTIONS   Read handouts on self-care for Overpronation and Plantar Fascitis.  Daily arch, calf stretching.   Roll arches over frozen water bottle in evening.   Glute strengthening- hip bridges (extension).  Advised on footwear for Overpronation.    Return 3-4 days.  ADD lower extremity to care plan.    Progress exercises.        10/27/2017 Plan of Care: 2017 Updated with Lower extremity care to bi-weekly for up to 4 weeks  Spinal Adjustments, Therapeutic Exercise Activities, and/or Therapeutic Modalities to meet Treatment Goals.     Frequency: 2xweek up to 4 weeks.    Re-eval 17.  POC Discussed and Pt. Agreeable to plan of care.      10/27/2017 Neck index 34% Back index 20%  See outcome assessment tools/indices scanned in chart.     Goals:  Reduce pain and improve Activities of Daily living measured by 10-30% improved index scores  Improve sleep and tolerance to reading, standing/walking by 1 point on indices.

## 2017-12-29 NOTE — PATIENT INSTRUCTIONS
Patient Information     Patient Name MRN Sex Shaista Milan [id number] Female 1990      Patient Instructions by Jeanette Gallardo DC at 2017  9:00 AM     Author:  Jeanette Gallardo DC Service:  (none) Author Type:  [facility]- Other provider     Filed:  2017  9:40 AM Encounter Date:  2017 Status:  Signed     :  Jeanette Gallardo DC ([facility]- Other provider)              INSTRUCTIONS   Continue POC    self-care for Overpronation and Plantar Fascitis.  Daily arch, calf stretching.   Roll arches over frozen water bottle in evening.   Glute strengthening- hip bridges (extension).  Advised on footwear for Overpronation and OTC ankle/arch support brace.    Return 6-9 days.           10/27/2017 Plan of Care: 2017 Updated with Lower extremity care to bi-weekly for up to 4 weeks  Spinal Adjustments, Therapeutic Exercise Activities, and/or Therapeutic Modalities to meet Treatment Goals.     Frequency: 2xweek up to 4 weeks.    Re-eval 17.  POC Discussed and Pt. Agreeable to plan of care.      10/27/2017 Neck index 34% Back index 20%  See outcome assessment tools/indices scanned in chart.     Goals:  Reduce pain and improve Activities of Daily living measured by 10-30% improved index scores  Improve sleep and tolerance to reading, standing/walking by 1 point on indices.

## 2017-12-29 NOTE — PATIENT INSTRUCTIONS
Patient Information     Patient Name MRN Sex Shaista Milan [id number] Female 1990      Patient Instructions by Jeanette Gallardo DC at 2017  1:30 PM     Author:  Jeanette Gallardo DC Service:  (none) Author Type:  [facility]- Other provider     Filed:  2017  2:07 PM Encounter Date:  2017 Status:  Signed     :  Jeanette Gallardo DC ([facility]- Other provider)                  Plan of Care: 2017 return as needed    2017 Neck index 14% Back index 12%  10/27/2017 Neck index 34% Back index 20%  See outcome assessment tools/indices scanned in chart.     Goals:  Reduce pain and improve Activities of Daily living measured by 10-30% improved index scores  Improve sleep and tolerance to reading, standing/walking by 1 point on indices. 2017 All goals met.    INSTRUCTIONS   Continue home exercise program and self-care and return if worsening.     self-care for Overpronation and Plantar Fascitis.  Daily arch, calf stretching.   Roll arches over frozen water bottle in evening.   Glute strengthening- hip bridges (extension).  Advised on footwear for Overpronation   use your OTC ankle/arch support brace.

## 2018-01-03 ENCOUNTER — COMMUNICATION - GICH (OUTPATIENT)
Dept: FAMILY MEDICINE | Facility: OTHER | Age: 28
End: 2018-01-03

## 2018-01-03 DIAGNOSIS — Z00.00 ENCOUNTER FOR GENERAL ADULT MEDICAL EXAMINATION WITHOUT ABNORMAL FINDINGS: ICD-10-CM

## 2018-01-04 NOTE — PATIENT INSTRUCTIONS
Patient Information     Patient Name MRN Shaista Rivas [id number] Female 1990      Patient Instructions by Terese Juarez NP at 2017  3:00 PM     Author:  Terese Juarez NP Service:  (none) Author Type:  PHYS- Nurse Practitioner     Filed:  2017  3:40 PM Encounter Date:  2017 Status:  Signed     :  Terese Juarez NP (PHYS- Nurse Practitioner)            Cold Medicines   What are cold medicines?  Symptoms of the common cold start gradually over several days and usually last about two weeks. Symptoms may include sneezing, a stuffy or runny nose, sore throat, cough, watery eyes, mild headache, or body aches. A cold will go away on its own without treatment. However, there are many nonprescription products that may help relieve some of the symptoms of a cold. Cold medicines often contain more than one ingredient and are used to treat more than one symptom. Read the labels and buy products that have only the ingredients that you need. If you are not sure which medicine is best, ask your pharmacist.  How do they work?  Decongestants reduce swelling in your nose and sinuses. They may also lessen the amount of mucus made by your nose. If you use decongestants more often than directed, your stuffy nose may get worse.   Antihistamines block the effect of histamine. Histamine is a chemical your body makes when you have an allergic reaction. Antihistamines are most often used to treat itchy or watery eyes or a stuffy or runny nose caused by an allergy. Antihistamines may not help a stuffy or runny nose caused by a cold because they can make mucus thick and dry.  Mucolytics are medicines that make mucus thinner so that it is easier to cough up out of your throat and lungs.  Expectorants are cough medicines that may help to keep the mucus thin and bring up mucus from the lungs when you cough. This may relieve chest congestion and make it easier to breathe.   Cough suppressants  (antitussives) are medicines that lessen the urge to cough. They may give relief from a dry, hacking cough. If you have a cough that is wet sounding and produces mucus, it is important for you to cough the mucus up out of your lungs. For this reason, cough suppressants are not recommended for a wet sounding cough.  Fever and pain relievers, such as acetaminophen, aspirin, or other nonsteroidal anti-inflammatory drugs (NSAIDs), are often included in cold medicine. Read labels carefully to avoid taking more medicine than you need.  What else do I need to know about this medicine?    Talk to your healthcare provider if your symptoms start suddenly or you have severe symptoms. This may mean you have something more serious than a cold.    Follow the directions that come with your medicine, including information about food or alcohol. Make sure you know how and when to take your medicine. Do not take more or less than you are supposed to take.    Try to get all of your medicine at the same place. Your pharmacist can help make sure that all of your medicines are safe to take together.    Keep a list of your medicines with you. List all of the prescription medicines, nonprescription medicines, supplements, natural remedies, and vitamins that you take. Tell all healthcare providers who treat you about all of the products you are taking.    Many medicines have side effects. A side effect is a symptom or problem that is caused by the medicine. Ask your healthcare provider or pharmacist what side effects the medicine may cause and what you should do if you have side effects.    Nonsteroidal anti-inflammatory medicines (NSAIDs), such as ibuprofen, naproxen, and aspirin, may cause stomach bleeding and other problems. These risks increase with age. Read the label and take as directed. Unless recommended by your healthcare provider, do not take for more than 10 days for any reason.    Acetaminophen may cause liver damage or other  problems. Unless recommended by your provider, don't take more than 3000 milligrams (mg) in 24 hours. To make sure you don t take too much, check other medicines you take to see if they also contain acetaminophen. Ask your provider if you need to avoid drinking alcohol while taking this medicine.  If you have any questions, ask your healthcare provider or pharmacist for more information. Be sure to keep all appointments for provider visits or tests.      Symptoms likely due to virus. No antibiotic is needed at this time. Symptoms typically worse on days 2-5 and then stabilize and you are sick for days 5-12. Days 12-14 there is slow resolution and if there is a cough, studies show it can linger longer, however one is not as ill as in the beginning. If symptoms begin worsening or fail to improve after 14 days, return to clinic for reevaluation.     I have prescribed a narcotic:    1) Please make sure you read the handout the pharmacist gives you on this drug.   2) Any unused narcotics need to be destroyed or taken to the police department.   3) Do not save unused narcotics   4) Do not give your pills to other people.   5) Do not crush, snort or otherwise alter the drug  6) Do not use in combination with other drugs such as benadryl, alcohol, or benzodiopines.   7) If you have any questions on your medication please call your pharmacist or PCP office.   8) This will not be refilled.

## 2018-01-04 NOTE — PROGRESS NOTES
"Patient Information     Patient Name MRN Sex Shaista Milan [id number] Female 1990      Progress Notes by Terese Juarez NP at 2017  3:00 PM     Author:  Terese Juarez NP Service:  (none) Author Type:  PHYS- Nurse Practitioner     Filed:  2017  3:53 PM Encounter Date:  2017 Status:  Signed     :  Terese Juarez NP (PHYS- Nurse Practitioner)            Nursing Notes:   Kaylene Loya  2017  3:39 PM  Signed  Patient presents with cough nonproductive, body aches, sore throat for 1 week. Kaylene Loya LPN .............2017  3:16 PM    SUBJECTIVE:    Shaista Esqueda is a 26 y.o. female who presents for URI s/s for one week    URI    This is a new problem. The current episode started in the past 7 days. The problem has been unchanged. There has been no fever. Associated symptoms include congestion, coughing, a plugged ear sensation, rhinorrhea and a sore throat. Pertinent negatives include no ear pain, sinus pain, sneezing, swollen glands, vomiting or wheezing. She has tried NSAIDs, decongestant and acetaminophen (Robitussin, Mucinex ) for the symptoms. The treatment provided mild relief.       Current Outpatient Prescriptions on File Prior to Visit       Medication  Sig Dispense Refill     levonorgestrel-ethinyl estrad, 0.1mg-20mcg, (LUTERA, 28,) 0.1-20 mg-mcg tablet Take 1 tablet by mouth once daily. 3 Package 4     No current facility-administered medications on file prior to visit.        REVIEW OF SYSTEMS:  Review of Systems   HENT: Positive for congestion, rhinorrhea and sore throat. Negative for ear pain and sneezing.    Respiratory: Positive for cough. Negative for wheezing.    Gastrointestinal: Negative for vomiting.       OBJECTIVE:  /80  Pulse (!) 111  Temp 99.2  F (37.3  C) (Tympanic)  Ht 1.66 m (5' 5.35\")  Wt 99.2 kg (218 lb 12.8 oz)  Breastfeeding? No  BMI 36.02 kg/m2    EXAM:   Physical Exam   Constitutional: She is " well-developed, well-nourished, and in no distress.   HENT:   Head: Normocephalic and atraumatic.   Right Ear: Tympanic membrane and ear canal normal.   Left Ear: Tympanic membrane and ear canal normal.   Nose: Rhinorrhea present.   Mouth/Throat: Uvula is midline, oropharynx is clear and moist and mucous membranes are normal.   Eyes: Conjunctivae are normal.   Neck: Neck supple.   Cardiovascular: Normal rate, regular rhythm and normal heart sounds.    Pulmonary/Chest: Effort normal and breath sounds normal. No respiratory distress. She has no decreased breath sounds. She has no wheezes. She has no rhonchi. She has no rales.   Dry cough noted   Lymphadenopathy:     She has no cervical adenopathy.   Nursing note and vitals reviewed.      ASSESSMENT/PLAN:    ICD-10-CM    1. Viral URI with cough J06.9 codeine-guaiFENesin (ROBITUSSIN AC)  mg/5 mL liquid     B97.89         Plan:  Home cares and OTC gone over. Symptoms likely due to virus. No antibiotic is needed at this time. Symptoms typically worse on days 2-5 and then stabilize and you are sick for days 5-12. Days 12-14 there is slow resolution and if there is a cough, studies show it can linger longer, however one is not as ill as in the beginning. If symptoms begin worsening or fail to improve after 14 days, return to clinic for reevaluation. All questions were answered and she is in agreement with plan. Robitussin AC gone over.        RITO CANO NP ....................  4/19/2017   3:52 PM

## 2018-01-04 NOTE — NURSING NOTE
Patient Information     Patient Name MRN Sex Shaista Milan [id number] Female 1990      Nursing Note by Kaylene Loya at 2017  3:00 PM     Author:  Kaylene Loya Service:  (none) Author Type:  NURS- Student Practical Nurse     Filed:  2017  3:39 PM Encounter Date:  2017 Status:  Signed     :  Kaylene Loya (NURS- Student Practical Nurse)            Patient presents with cough nonproductive, body aches, sore throat for 1 week. Kaylene Loya LPN .............2017  3:16 PM

## 2018-01-18 ENCOUNTER — OFFICE VISIT - GICH (OUTPATIENT)
Dept: FAMILY MEDICINE | Facility: OTHER | Age: 28
End: 2018-01-18

## 2018-01-18 ENCOUNTER — HISTORY (OUTPATIENT)
Dept: FAMILY MEDICINE | Facility: OTHER | Age: 28
End: 2018-01-18

## 2018-01-18 DIAGNOSIS — Z00.00 ENCOUNTER FOR GENERAL ADULT MEDICAL EXAMINATION WITHOUT ABNORMAL FINDINGS: ICD-10-CM

## 2018-01-18 DIAGNOSIS — Z30.09 ENCOUNTER FOR OTHER GENERAL COUNSELING AND ADVICE ON CONTRACEPTION: ICD-10-CM

## 2018-01-18 DIAGNOSIS — N94.6 DYSMENORRHEA: ICD-10-CM

## 2018-01-18 DIAGNOSIS — Z12.4 ENCOUNTER FOR SCREENING FOR MALIGNANT NEOPLASM OF CERVIX: ICD-10-CM

## 2018-01-21 ENCOUNTER — HISTORY (OUTPATIENT)
Dept: FAMILY MEDICINE | Facility: OTHER | Age: 28
End: 2018-01-21

## 2018-01-26 LAB — HPV RESULTS - HISTORICAL: NEGATIVE

## 2018-01-27 ENCOUNTER — COMMUNICATION - GICH (OUTPATIENT)
Dept: FAMILY MEDICINE | Facility: OTHER | Age: 28
End: 2018-01-27

## 2018-01-27 VITALS
DIASTOLIC BLOOD PRESSURE: 92 MMHG | SYSTOLIC BLOOD PRESSURE: 124 MMHG | HEART RATE: 90 BPM | RESPIRATION RATE: 18 BRPM | TEMPERATURE: 97.8 F

## 2018-01-27 VITALS
WEIGHT: 218.8 LBS | SYSTOLIC BLOOD PRESSURE: 120 MMHG | TEMPERATURE: 99.2 F | HEIGHT: 65 IN | BODY MASS INDEX: 36.46 KG/M2 | DIASTOLIC BLOOD PRESSURE: 80 MMHG | HEART RATE: 111 BPM

## 2018-01-30 ENCOUNTER — DOCUMENTATION ONLY (OUTPATIENT)
Dept: FAMILY MEDICINE | Facility: OTHER | Age: 28
End: 2018-01-30

## 2018-01-30 PROBLEM — Z82.49 FAMILY HISTORY OF ISCHEMIC HEART DISEASE: Status: ACTIVE | Noted: 2018-01-30

## 2018-01-30 PROBLEM — Z80.3 FAMILY HISTORY OF MALIGNANT NEOPLASM OF BREAST: Status: ACTIVE | Noted: 2018-01-30

## 2018-01-30 RX ORDER — LEVONORGESTREL/ETHIN.ESTRADIOL 0.1-0.02MG
1 TABLET ORAL DAILY
COMMUNITY
Start: 2018-01-05 | End: 2018-04-04

## 2018-02-09 VITALS
WEIGHT: 213.38 LBS | HEIGHT: 65 IN | HEART RATE: 90 BPM | DIASTOLIC BLOOD PRESSURE: 82 MMHG | BODY MASS INDEX: 35.55 KG/M2 | SYSTOLIC BLOOD PRESSURE: 122 MMHG

## 2018-02-12 NOTE — TELEPHONE ENCOUNTER
Patient Information     Patient Name MRN Sex Shaista Milan [id number] Female 1990      Telephone Encounter by Dania Guajardo RN at 2018  8:16 AM     Author:  Dania Guajardo RN Service:  (none) Author Type:  NURS- Registered Nurse     Filed:  2018  8:18 AM Encounter Date:  1/3/2018 Status:  Signed     :  Dania Guajardo RN (NURS- Registered Nurse)            This is a Refill request from: [facility]  Name of Medication: AVIANE 0.1-20 mg-mcg tablet  TAKE 1 TABLET BY MOUTH ONCE DAILY  Quantity requested: 84  Last fill date: 10/23/17  Due for refill: yes  Last visit with OSMEL VIDAL was on: 2016 in [facility]  PCP:  Osmel Vidal NP  Controlled Substance Agreement:  na   Diagnosis r/t this medication request: preventive health management    LOV was 16. Was supposed to come back in 2017 for PAP and be seen yearly to Osmel Vidal NP     Unable to complete prescription refill per RN Medication Refill Policy.................... DANIA GUAJARDO RN ....................  2018   8:16 AM

## 2018-02-13 NOTE — PATIENT INSTRUCTIONS
Patient Information     Patient Name MRShaista Elizabeth [id number] Female 1990      Patient Instructions by Venita Montgomery NP at 2018  3:08 PM     Author:  Venita Montgomery NP  Service:  (none) Author Type:  PHYS- Nurse Practitioner     Filed:  2018  3:09 PM  Encounter Date:  2018 Status:  Addendum     :  Venita Montgomery NP (PHYS- Nurse Practitioner)        Related Notes: Original Note by Venita Montgomery NP (PHYS- Nurse Practitioner) filed at 2018  3:08 PM               Index Occitan All languages Related topics   Birth Control Methods   ________________________________________________________________________  KEY POINTS    Birth control methods are ways to keep from getting pregnant when you have sex.    Birth control methods include condoms, medicines, devices, and surgery.    Talk to your healthcare provider about what birth control method is right for you, and ask any questions you may have.  ________________________________________________________________________  What are birth control methods?  Birth control methods are ways to keep from getting pregnant when you have sex. There are many different ways to try to prevent pregnancy. Some methods work better than others.   You may want to choose a kind of birth control that will help keep you from getting a sexually transmitted disease or infection (STD). Sometimes you may need to use more than 1 method to prevent pregnancy and infection. You can use latex or polyurethane condoms to protect against infection. Condoms are the only birth control method that will also lower your risk of being infected with HIV, the virus that causes AIDS. Hormones, natural family planning, and withdrawal do not give any protection against infection.  What are the different methods of birth control  Hormone Medicine   Hormone medicines are birth control medicines for women. They use manmade forms of the female hormones estrogen and/or  progesterone. The hormones stop a woman's ovaries from releasing an egg each month. They also make it harder for sperm to get into the uterus and harder for a fertilized egg to stay in the uterus.    Birth control pills are taken every day according to a schedule prescribed by your healthcare provider.    One shot of Depo-Provera, which contains progesterone, can prevent pregnancy for 3 months.    Vaginal rings are soft rings that you put into the vagina yourself. The rings release hormones into the body. A ring stays in the vagina for 3 weeks. Then you take it out and have your period. After 1 week, you put a new ring into the vagina and repeat the cycle.    Patches with hormones may be put on your skin. You put a new patch on your skin each week for 3 weeks. You don t use a patch during the 4th week and you have your period. Then you repeat the cycle.    The implant is a small, thin capsule containing progestin. Your healthcare provider puts it under the skin of your arm. The implant prevents pregnancy for up to 3 years.  You need to see your healthcare provider to get any of these hormonal forms of birth control.  Birth Control Devices  Most birth control devices provide barriers that stop sperm from getting into the uterus. The barrier may be physical or chemical.    The male condom is a tube of thin material. Latex rubber or polyurethane is best. You roll the condom over the erect penis before the penis touches any part of a woman's genital area. The male condom is the best protection against STDs.    The female condom is a pouch you put into the vagina before sex. It is made of polyurethane and has 2 flexible rings. It covers the vagina, the cervix (the opening to the womb inside the vagina), and the area around the vagina. It may protect against some STDs, such as HIV and hepatitis B.    Spermicides are chemicals that kill sperm. They come in different forms, like foam, gel, cream, film, suppository, and  tablet. You put them into the vagina no more than 30 minutes before sex. Spermicides work much better when they are used with another form of birth control, like a condom or diaphragm. Spermicides do not protect against STDs.    The sponge is a round, soft piece of polyurethane foam. It is soaked with a spermicide. No more than 24 hours before sex, you dampen the sponge with water and put it into the vagina.    The diaphragm is a soft, shallow rubber or silicone cup with a flexible rim. Before you have sex, you fill the diaphragm with a spermicide and put it into the vagina, over the cervix.    The cervical cap is a soft silicone cup that you put into the vagina and over the cervix. It is like a diaphragm except smaller. Like a diaphragm, it must be used with a spermicide to work right.    The intrauterine device (IUD) is a small plastic device containing copper or hormones. It is put inside the uterus by your healthcare provider. The IUD keeps the egg from getting fertilized or implanting and growing in the uterus. The IUD may be kept in the uterus 3 to 10 years, depending on the type, before it must be replaced with a new one.  You can buy condoms, spermicides, and sponges at drug and grocery stores without a prescription. Diaphragms and cervical caps need to be fitted by a healthcare provider. IUDs must be put into the uterus by a healthcare provider.  Sterilization  Sterilization is a procedure done to close the tubes that carry the sperm or eggs. It may be done with surgery through your belly or with special devices inserted through the vagina and into the fallopian tubes. A woman or man who is sterilized will no longer be able to have children. The procedure is usually a permanent method of birth control. It s very rare to have this procedure if you are younger than 21 years old or if you have no children.  Surgery to remove a woman s uterus (hysterectomy) also causes sterility.  Natural Family Planning and  the Withdrawal Method  The natural family planning methods of birth control don t use any devices, drugs, or surgery. To prevent pregnancy you avoid having sex on certain days of each menstrual cycle. Some of the other methods of birth control are usually more reliable.  The withdrawal method involves taking the penis out of the vagina before semen starts coming out of the penis. Often sperm get into the vagina before or during withdrawal. Withdrawal is NOT a reliable way to prevent pregnancy.  Emergency Birth Control  There are pills that can be taken for emergency birth control. The pills contain a hormone that will prevent pregnancy if a woman takes them very soon after she has unprotected sex. A woman might take these pills if a condom breaks, or if she had sex without any birth control. Depending on the type of medicine in the pills, the pills need to be taken no later than 72 to 120 hours (3 to 5 days) after sex. You can buy some types of emergency birth control pills without a prescription. Carefully follow the package instructions or your healthcare provider's directions for taking the pills.  A copper intrauterine device (IUD) is another way to prevent pregnancy after unprotected sex. The IUD must be put into the uterus by a healthcare provider within 5 days after sex.  Sterilization, the IUD, and hormone medicines, such as birth control pills, are the most effective methods of birth control. However, the diaphragm and condom can be nearly as reliable if they are used correctly.  Developed by [employer].  Adult Advisor 2017.2 published by [employer].  Last modified: 2016-07-14  Last reviewed: 2016-01-25  This content is reviewed periodically and is subject to change as new health information becomes available. The information is intended to inform and educate and is not a replacement for medical evaluation, advice, diagnosis or treatment by a healthcare professional.  References   Adult Advisor 2017.2  Index    Copyright   2017 [employer], a division of [employer] All rights reserved.

## 2018-02-13 NOTE — PROGRESS NOTES
Patient Information     Patient Name Shaista Barnoe [id number] Female 1990      Progress Notes by Venita Montgomery NP at 2018  2:30 PM     Author:  Venita Montgomery NP Service:  (none) Author Type:  PHYS- Nurse Practitioner     Filed:  2018 10:42 AM Encounter Date:  2018 Status:  Signed     :  Venita Montgomery NP (PHYS- Nurse Practitioner)            SUBJECTIVE:    Shaista Esqueda is a 27 y.o. female who presents for preventative screening, due for Pap --last Pap  negative cytology no reflex HPV. Review of medications to discuss contraceptive management. Has been on oral contraceptives for years, has tolerated done well. tobacco use.  Like to consider other options, not interested in patch, NuvaRing, Depo-Provera. Would like to consider nexplanon or mirena.  Would not tolerate continued or intermittent spotting--- uncertain regarding nexplanon as this is not predictable.  She is sexually active, same partner for years, has no concerns asymptomatic of any STI's and does not feel STD screening is necessary.  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  reports has had an ongoing intermittent cough, post nasal congestion, denies any fever, sore throat or sinus pain or pressure. Cough is dry. Feels may have gotten another viral illness, many ill contacts at work.  .    HPI    Allergies     Allergen  Reactions     Amoxicillin Hives     Ceclor [Cefaclor] *Unknown     Septra [Sulfamethoxazole-Trimethoprim] *Unknown   ,   Family History       Problem   Relation Age of Onset     Good Health  Mother      Thyroid Disease  Mother      Cancer-breast  Maternal Aunt 60     Cancer-breast  Maternal Aunt 60     Heart Disease  Maternal Grandfather      Family History Coronary Heart Disease male < 55 - m uncle, mgf       Cancer-prostate  Maternal Uncle      Thyroid Disease  Maternal Uncle      Seizures  Maternal Aunt      Heart Disease  Paternal Uncle 54      "MI       Cancer-ovarian  No Family History      Anesthesia Problem  No Family History      Blood Disease  No Family History      Stroke  No Family History      Cancer-colon  No Family History      Hypertension  No Family History      Hyperlipidemia  No Family History    ,   No current outpatient prescriptions on file prior to visit.     No current facility-administered medications on file prior to visit.    ,   Current Outpatient Prescriptions:      levonorgestrel-ethinyl estrad, 0.1mg-20mcg, (AVIANE) 0.1-20 mg-mcg tablet, Take 1 tablet by mouth once daily., Disp: 3 Package, Rfl: 4  Medications have been reviewed by me and are current to the best of my knowledge and ability., ,   Patient Active Problem List      Diagnosis Date Noted     CONTRACEPTIVE MANAGEMENT 08/08/2012     DYSMENORRHEA 08/22/2011     STD TESTING 08/22/2011     FAMILY HISTORY BREAST CANCER      FAMILY HISTORY CORONARY HEART DISEASE MALE < 55      TMJ SYNDROME 05/25/2010     BCPS-SUBSEQUENT RX 05/25/2010   ,   Past Surgical History:      Procedure  Laterality Date     PAST SURGICAL HISTORY      None      and   Social History        Substance Use Topics          Smoking status:   Current Every Day Smoker      Packs/day:  0.25      Types:  Cigarettes      Last attempt to quit:  4/19/2015      Smokeless tobacco:   Never Used      Alcohol use   Yes      Comment: Socially         REVIEW OF SYSTEMS:  Review of Systems   Constitutional: Negative.    HENT: Positive for congestion.    Eyes: Negative.    Respiratory: Negative.    Cardiovascular: Negative.    Gastrointestinal: Negative.    Genitourinary: Negative.    Musculoskeletal: Negative.    Skin: Negative.    Neurological: Negative.    Endo/Heme/Allergies: Negative.    Psychiatric/Behavioral: Negative.        OBJECTIVE:  /82 (Cuff Site: Right Arm, Position: Sitting, Cuff Size: Adult Regular)  Pulse 90  Ht 1.66 m (5' 5.35\")  Wt 96.8 kg (213 lb 6 oz)  LMP 01/07/2018 (Approximate)  BMI 35.12 " kg/m2    EXAM:   Physical Exam   Constitutional: She is oriented to person, place, and time and well-developed, well-nourished, and in no distress.   Cardiovascular: Normal rate and regular rhythm.    Pulmonary/Chest: Effort normal and breath sounds normal.   Genitourinary: No vaginal discharge found.   Genitourinary Comments: Pelvic exam negative for any bleeding, visible lesions or discharge. Cervix retroverted. Pap obtained   Musculoskeletal: Normal range of motion.   Neurological: She is alert and oriented to person, place, and time. Gait normal.   Skin: Skin is warm and dry.   Psychiatric: Mood, memory, affect and judgment normal.   Nursing note and vitals reviewed.      ASSESSMENT/PLAN:    ICD-10-CM    1. Cervical cancer screening Z12.4 GYN THIN PREP PAP SCREEN IMAGED      GYN THIN PREP PAP SCREEN IMAGED   2. Encounter for preventive health examination Z00.00 levonorgestrel-ethinyl estrad, 0.1mg-20mcg, (AVIANE) 0.1-20 mg-mcg tablet   3. CONTRACEPTIVE MANAGEMENT Z30.09    4. Dysmenorrhea N94.6     lab pending    she will contact her insurance carrier to find out coverage regarding implant contraceptives  directed to schedule appointment for Mirena or Nexplanon if would like to proceed--options will be limited in a few years if she continues to smoke tobacco  slow release progesterone would be optimal    Plan:  We'll refill oral contraceptives as requested and she will continue with this until she has decided other options  she can contact me at any time if she needs referral    Recommend trying nasal saline or nasal corticosteroid for postnasal drainage--- discussed red flag symptoms and will call or return to clinic if indicated  this is not really bothersome to her at this time    Vaccines current

## 2018-02-13 NOTE — NURSING NOTE
Patient Information     Patient Name MRN Sex Shaista Milan [id number] Female 1990      Nursing Note by Alena Kendall at 2018  2:30 PM     Author:  Alena Kendall Service:  (none) Author Type:  (none)     Filed:  2018  2:43 PM Encounter Date:  2018 Status:  Signed     :  Alena Kendall            Patient presents to clinic today for a physical. She states she also wants to discuss birth control. She also states she has had a lingering cough.    Alena Kendall LPN...................2018  2:22 PM

## 2018-02-13 NOTE — ADDENDUM NOTE
Patient Information     Patient Name MRN Sex Shaista Milan [id number] Female 1990      Addendum Note by Victoria Fiore at 2018  1:42 PM     Author:  Victoria Fiore Service:  (none) Author Type:  (none)     Filed:  2018  1:42 PM Encounter Date:  2018 Status:  Signed     :  Victoria Fiore       Addended by: VICTORIA FIORE on: 2018 01:42 PM        Modules accepted: Orders

## 2018-03-05 ENCOUNTER — OFFICE VISIT (OUTPATIENT)
Dept: OBGYN | Facility: OTHER | Age: 28
End: 2018-03-05
Attending: OBSTETRICS & GYNECOLOGY
Payer: COMMERCIAL

## 2018-03-05 ENCOUNTER — HEALTH MAINTENANCE LETTER (OUTPATIENT)
Age: 28
End: 2018-03-05

## 2018-03-05 VITALS
HEART RATE: 108 BPM | HEIGHT: 65 IN | SYSTOLIC BLOOD PRESSURE: 120 MMHG | DIASTOLIC BLOOD PRESSURE: 80 MMHG | BODY MASS INDEX: 33.76 KG/M2 | WEIGHT: 202.6 LBS

## 2018-03-05 DIAGNOSIS — Z01.812 PRE-PROCEDURE LAB EXAM: ICD-10-CM

## 2018-03-05 DIAGNOSIS — Z30.430 ENCOUNTER FOR IUD INSERTION: Primary | ICD-10-CM

## 2018-03-05 LAB — HCG UR QL: NEGATIVE

## 2018-03-05 PROCEDURE — 81025 URINE PREGNANCY TEST: CPT | Performed by: OBSTETRICS & GYNECOLOGY

## 2018-03-05 PROCEDURE — 58300 INSERT INTRAUTERINE DEVICE: CPT | Performed by: OBSTETRICS & GYNECOLOGY

## 2018-03-05 PROCEDURE — 25000125 ZZHC RX 250: Performed by: OBSTETRICS & GYNECOLOGY

## 2018-03-05 RX ADMIN — LEVONORGESTREL: 52 INTRAUTERINE DEVICE INTRAUTERINE at 13:48

## 2018-03-05 ASSESSMENT — ANXIETY QUESTIONNAIRES
1. FEELING NERVOUS, ANXIOUS, OR ON EDGE: NOT AT ALL
2. NOT BEING ABLE TO STOP OR CONTROL WORRYING: NOT AT ALL

## 2018-03-05 ASSESSMENT — PAIN SCALES - GENERAL: PAINLEVEL: NO PAIN (0)

## 2018-03-05 NOTE — PROGRESS NOTES
Shaista presents for placement of a Mirena IUD.  Pregnancy test is negative today.    REVIEW OF SYSTEMS:  Full review ofsystems negative other than those issues mentioned in HPI above.    No past medical history on file.    Current Outpatient Prescriptions   Medication     levonorgestrel-ethinyl estradiol (AVIANE) 0.1-20 MG-MCG per tablet     No current facility-administered medications for this visit.        Past Surgical History:   Procedure Laterality Date     OTHER SURGICAL HISTORY      63116.0,PAST SURGICAL HISTORY,None       Social History     Social History     Marital status: Single     Spouse name: N/A     Number of children: N/A     Years of education: N/A     Occupational History     Not on file.     Social History Main Topics     Smoking status: Current Every Day Smoker     Packs/day: 0.25     Types: Cigarettes     Last attempt to quit: 4/19/2015     Smokeless tobacco: Never Used     Alcohol use Yes      Comment: Alcoholic Drinks/day: Socially     Drug use: No      Comment: Drug use: No     Sexual activity: Not Currently     Partners: Male     Birth control/ protection: IUD     Other Topics Concern     Not on file     Social History Narrative    Graduated from [employer] in [employer].     Works at [employer].     Single    Preload 7/23/2013       Family History   Problem Relation Age of Onset     Family History Negative Mother      Good Health     Thyroid Disease Mother      Thyroid Disease     Breast Cancer Maternal Aunt 60     Cancer-breast     HEART DISEASE Maternal Grandfather      Heart Disease,Family History Coronary Heart Disease male < 55 - m uncle, mgf     Breast Cancer Maternal Aunt 60     Cancer-breast     Prostate Cancer Maternal Uncle      Cancer-prostate     Thyroid Disease Maternal Uncle      Thyroid Disease     Seizure Disorder Maternal Aunt      Seizures     HEART DISEASE Paternal Uncle 54     Heart Disease,MI     Ovarian Cancer No family hx of      Cancer-ovarian  "    Anesthesia Reaction No family hx of      Anesthesia Problem     Blood Disease No family hx of      Blood Disease     Other - See Comments No family hx of      Stroke     Colon Cancer No family hx of      Cancer-colon     Hypertension No family hx of      Hypertension     Hyperlipidemia No family hx of      Hyperlipidemia       Allergies   Allergen Reactions     Amoxicillin Hives     Cefaclor Unknown     Sulfamethoxazole-Trimethoprim Unknown       Vitals:    03/05/18 1328   BP: 120/80   BP Location: Right arm   Patient Position: Sitting   Cuff Size: Adult Large   Pulse: 108   Weight: 91.9 kg (202 lb 9.6 oz)   Height: 1.651 m (5' 5\")       After appropriate consent andreviewing risks including perforation, migration, abnormal bleeding, ovarian cyst formation and infection, preparation was performed to place the IUD.      In lithotomy, a speculum was placed.  The cervix appeared normal and was prepped with betadine wash x 2.  The uterus sounded to 8 cm.  The IUD was easily placed into position without difficulty.  The strings were cut to 4 cm.  Minimal  bleeding was noted.  The speculum was removed and the patient returned to supine.  She tolerated the procedure well.  There were no complications.    She was instructed on how to check the strings following her next menses or in one month.    ASSESSMENT : Desire for IUD placement    PLAN:  Placement completed   Follow-up in one month to assess correct positioning    Vu Helm MD  1:45 PM 3/5/2018   "

## 2018-03-05 NOTE — NURSING NOTE
Patient presents to the clinic for an IUD insert.    Prior to the start of the procedure and with procedural staff participation, I verbally confirmed the patient s identity using two indicators, relevant allergies, that the procedure was appropriate and matched the consent or emergent situation, and that the correct equipment/implants were available. Immediately prior to starting the procedure I conducted the Time Out with the procedural staff and re-confirmed the patient s name, procedure, and site/side. (The Joint Commission universal protocol was followed.)  Yes    Sedation (Moderate or Deep): None  Kody Dyson ..............3/5/2018 1:21 PM

## 2018-03-05 NOTE — LETTER
3/5/2018       RE: Shaista Esqueda  [address]   [facility] [address]     Dear Colleague,    Thank you for referring your patient, Shaista Esqueda, to the [facility] at [facility]. Please see a copy of my visit note below.    Shaista presents for placement of a Mirena IUD.  Pregnancy test is negative today.    REVIEW OF SYSTEMS:  Full review ofsystems negative other than those issues mentioned in HPI above.    No past medical history on file.    Current Outpatient Prescriptions   Medication     levonorgestrel-ethinyl estradiol (AVIANE) 0.1-20 MG-MCG per tablet     No current facility-administered medications for this visit.        Past Surgical History:   Procedure Laterality Date     OTHER SURGICAL HISTORY      13502.0,PAST SURGICAL HISTORY,None       Social History     Social History     Marital status: Single     Spouse name: N/A     Number of children: N/A     Years of education: N/A     Occupational History     Not on file.     Social History Main Topics     Smoking status: Current Every Day Smoker     Packs/day: 0.25     Types: Cigarettes     Last attempt to quit: 4/19/2015     Smokeless tobacco: Never Used     Alcohol use Yes      Comment: Alcoholic Drinks/day: Socially     Drug use: No      Comment: Drug use: No     Sexual activity: Not Currently     Partners: Male     Birth control/ protection: IUD     Other Topics Concern     Not on file     Social History Narrative    Graduated from [employer] in [employer].     Works at [employer].     Single    Preload 7/23/2013       Family History   Problem Relation Age of Onset     Family History Negative Mother      Good Health     Thyroid Disease Mother      Thyroid Disease     Breast Cancer Maternal Aunt 60     Cancer-breast     HEART DISEASE Maternal Grandfather      Heart Disease,Family History Coronary Heart Disease male < 55 - m uncle, mgf     Breast Cancer Maternal Aunt 60      "Cancer-breast     Prostate Cancer Maternal Uncle      Cancer-prostate     Thyroid Disease Maternal Uncle      Thyroid Disease     Seizure Disorder Maternal Aunt      Seizures     HEART DISEASE Paternal Uncle 54     Heart Disease,MI     Ovarian Cancer No family hx of      Cancer-ovarian     Anesthesia Reaction No family hx of      Anesthesia Problem     Blood Disease No family hx of      Blood Disease     Other - See Comments No family hx of      Stroke     Colon Cancer No family hx of      Cancer-colon     Hypertension No family hx of      Hypertension     Hyperlipidemia No family hx of      Hyperlipidemia       Allergies   Allergen Reactions     Amoxicillin Hives     Cefaclor Unknown     Sulfamethoxazole-Trimethoprim Unknown       Vitals:    03/05/18 1328   BP: 120/80   BP Location: Right arm   Patient Position: Sitting   Cuff Size: Adult Large   Pulse: 108   Weight: 91.9 kg (202 lb 9.6 oz)   Height: 1.651 m (5' 5\")       After appropriate consent andreviewing risks including perforation, migration, abnormal bleeding, ovarian cyst formation and infection, preparation was performed to place the IUD.      In lithotomy, a speculum was placed.  The cervix appeared normal and was prepped with betadine wash x 2.  The uterus sounded to 8 cm.  The IUD was easily placed into position without difficulty.  The strings were cut to 4 cm.  Minimal  bleeding was noted.  The speculum was removed and the patient returned to supine.  She tolerated the procedure well.  There were no complications.    She was instructed on how to check the strings following her next menses or in one month.    ASSESSMENT : Desire for IUD placement    PLAN:  Placement completed   Follow-up in one month to assess correct positioning      Again, thank you for allowing me to participate in the care of your patient.      Sincerely,    NEPTALI GUERRERO MD      "

## 2018-03-05 NOTE — MR AVS SNAPSHOT
"              After Visit Summary   3/5/2018    Shaista Esqueda    MRN: [id number]           Patient Information     Date Of Birth          1990        Visit Information        Provider Department      3/5/2018 1:30 PM Vu Helm MD [facility]        Today's Diagnoses     Encounter for IUD insertion    -  1    Pre-procedure lab exam           Follow-ups after your visit        Follow-up notes from your care team     Return in about 4 weeks (around 4/2/2018) for Routine Visit.      Your next 10 appointments already scheduled     Apr 02, 2018 12:45 PM CDT   SHORT with Vu Helm MD   [facility]    [address]  [place] [address] [phone]              Who to contact     If you have questions or need follow up information about today's clinic visit or your schedule please contact [facility] directly at [phone].  Normal or non-critical lab and imaging results will be communicated to you by Fliptophart, letter or phone within 4 business days after the clinic has received the results. If you do not hear from us within 7 days, please contact the clinic through InfoScout[employer] or phone. If you have a critical or abnormal lab result, we will notify you by phone as soon as possible.  Submit refill requests through [employer] or call your pharmacy and they will forward the refill request to us. Please allow 3 business days for your refill to be completed.          Additional Information About Your Visit        Fliptophart Information     [employer] lets you send messages to your doctor, view your test results, renew your prescriptions, schedule appointments and more. To sign up, go to [email or web address] . Click on \"Log in\" on the left side of the screen, which will take you to the Welcome page. Then click on \"Sign up Now\" on the right side of the page.     You will be asked to enter the access code " "listed below, as well as some personal information. Please follow the directions to create your username and password.     Your access code is: [email or web address]  Expires: 6/3/2018  1:51 PM     Your access code will  in 90 days. If you need help or a new code, please call your [facility] or [phone].        Care EveryWhere ID     This is your Care EveryWhere ID. This could be used by other organizations to access your [facility] medical records  [id number]        Your Vitals Were     Pulse Height Last Period Breastfeeding? BMI (Body Mass Index)       108 1.651 m (5' 5\") 2018 (LMP Unknown) No 33.71 kg/m2        Blood Pressure from Last 3 Encounters:   18 120/80   18 122/82   10/27/17 (!) 124/92    Weight from Last 3 Encounters:   18 91.9 kg (202 lb 9.6 oz)   18 96.8 kg (213 lb 6 oz)   17 99.2 kg (218 lb 12.8 oz)              We Performed the Following     HCG qualitative urine     INSERTION INTRAUTERINE DEVICE        Primary Care Provider Office Phone # Fax #    MAGDA Aviles [phone]       [address] [facility] [address]        Equal Access to Services     LAYA JOY : Hadii aad ku hadasho Soomaali, waaxda luqadaha, qaybta kaalmada adeegyagerman, ruslan parikh . So [facility] [phone].    ATENCIÓN: Si habla español, tiene a driscoll disposición servicios gratuitos de asistencia lingüística. Llame al [phone].    We comply with applicable federal civil rights laws and [place] laws. We do not discriminate on the basis of race, color, national origin, age, disability, sex, sexual orientation, or gender identity.            Thank you!     Thank you for choosing [facility]  for your care. Our goal is always to provide you with excellent care. Hearing back from our patients is one way we can continue to improve our services. Please take a few minutes to complete the written survey that you may " receive in the mail after your visit with us. Thank you!             Your Updated Medication List - Protect others around you: Learn how to safely use, store and throw away your medicines at www.disposemymeds.org.          This list is accurate as of 3/5/18  1:51 PM.  Always use your most recent med list.                   Brand Name Dispense Instructions for use Diagnosis    AVIANE 0.1-20 MG-MCG per tablet   Generic drug:  levonorgestrel-ethinyl estradiol      Take 1 tablet by mouth daily

## 2018-04-04 ENCOUNTER — OFFICE VISIT (OUTPATIENT)
Dept: OBGYN | Facility: OTHER | Age: 28
End: 2018-04-04
Attending: OBSTETRICS & GYNECOLOGY
Payer: COMMERCIAL

## 2018-04-04 VITALS — DIASTOLIC BLOOD PRESSURE: 80 MMHG | SYSTOLIC BLOOD PRESSURE: 110 MMHG | HEIGHT: 65 IN

## 2018-04-04 DIAGNOSIS — Z30.431 CONTRACEPTIVE, SURVEILLANCE, INTRAUTERINE DEVICE: Primary | ICD-10-CM

## 2018-04-04 PROCEDURE — 99212 OFFICE O/P EST SF 10 MIN: CPT | Performed by: OBSTETRICS & GYNECOLOGY

## 2018-04-04 ASSESSMENT — ANXIETY QUESTIONNAIRES
2. NOT BEING ABLE TO STOP OR CONTROL WORRYING: NOT AT ALL
1. FEELING NERVOUS, ANXIOUS, OR ON EDGE: NOT AT ALL

## 2018-04-04 ASSESSMENT — PAIN SCALES - GENERAL: PAINLEVEL: NO PAIN (0)

## 2018-04-04 NOTE — NURSING NOTE
Patient presents to the clinic for an IUD check. Patient denies any concerns at this time.  Kody Dyson ..............4/4/2018 11:23 AM

## 2018-04-04 NOTE — LETTER
"4/4/2018       RE: Shaista Esqueda  [address]   [facility] [address]     Dear Colleague,    Thank you for referring your patient, Shaista Esqueda, to the [facility] at [facility]. Please see a copy of my visit note below.    SUBJECTIVE: Shaista is here for an IUD check 1 month after initial placement.  She has no complaints and is doing well.    OBJECTIVE: /80 (BP Location: Right arm, Patient Position: Sitting, Cuff Size: Adult Large)  Ht 1.651 m (5' 5\")  LMP 03/27/2018  Physical Exam   Constitutional: She is oriented to person, place, and time and well-developed, well-nourished, and in no distress.   HENT:   Head: Normocephalic and atraumatic.   Eyes: Pupils are equal, round, and reactive to light.   Genitourinary:   Genitourinary Comments: IUD strings clearly visible at the cervical os.  Kody present for the exam.   Neurological: She is alert and oriented to person, place, and time.   Skin: Skin is warm and dry.   Psychiatric: Mood, memory, affect and judgment normal.         ASSESSMENT: Normal IUD check    PLAN: F/U for annual exams or as needed.      Again, thank you for allowing me to participate in the care of your patient.      Sincerely,    NEPTALI GUERRERO MD      "

## 2018-04-04 NOTE — PROGRESS NOTES
"SUBJECTIVE: Shaista is here for an IUD check 1 month after initial placement.  She has no complaints and is doing well.    OBJECTIVE: /80 (BP Location: Right arm, Patient Position: Sitting, Cuff Size: Adult Large)  Ht 1.651 m (5' 5\")  LMP 03/27/2018  Physical Exam   Constitutional: She is oriented to person, place, and time and well-developed, well-nourished, and in no distress.   HENT:   Head: Normocephalic and atraumatic.   Eyes: Pupils are equal, round, and reactive to light.   Genitourinary:   Genitourinary Comments: IUD strings clearly visible at the cervical os.  Kody present for the exam.   Neurological: She is alert and oriented to person, place, and time.   Skin: Skin is warm and dry.   Psychiatric: Mood, memory, affect and judgment normal.         ASSESSMENT: Normal IUD check    PLAN: F/U for annual exams or as needed.    NEPTALI GUERRERO MD on 4/4/2018 at 11:41 AM    "

## 2018-07-23 NOTE — PROGRESS NOTES
Patient Information     Patient Name  Shaista Esqueda MRN  [id number] Sex  Female   1990      Letter by Venita Montgomery NP at      Author:  Venita Montgomery NP Service:  (none) Author Type:  (none)    Filed:   Encounter Date:  2018 Status:  (Other)           Shaista Esqueda  [address]  [facility] [address]          2018    Dear Ms. Esqueda:    The result from the Pap and HPV (Human Papilloma Virus) test(s) you had done at your recent clinic visit came back as normal. Your history of normal Paps next Pap due in 3-5 years.    Thank you for choosing [facility] to participate in your healthcare needs.    Sincerely,    Venita Montgomery NP ....................  2018   3:55 PM          The [place] Screening Program is a statewide comprehensive breast and cervical cancer screening program that provides free screening and follow-up services (including colposcopy) to uninsured and underinsured women. For more information call toll free 3-035-[employer] ([phone])

## 2018-07-30 ENCOUNTER — APPOINTMENT (OUTPATIENT)
Dept: GENERAL RADIOLOGY | Facility: OTHER | Age: 28
End: 2018-07-30
Attending: PHYSICIAN ASSISTANT
Payer: COMMERCIAL

## 2018-07-30 ENCOUNTER — HOSPITAL ENCOUNTER (EMERGENCY)
Facility: OTHER | Age: 28
Discharge: HOME | End: 2018-07-30
Attending: PHYSICIAN ASSISTANT | Admitting: PHYSICIAN ASSISTANT
Payer: COMMERCIAL

## 2018-07-30 VITALS
DIASTOLIC BLOOD PRESSURE: 90 MMHG | SYSTOLIC BLOOD PRESSURE: 122 MMHG | OXYGEN SATURATION: 100 % | WEIGHT: 204 LBS | BODY MASS INDEX: 33.99 KG/M2 | HEART RATE: 94 BPM | TEMPERATURE: 98.4 F | HEIGHT: 65 IN | RESPIRATION RATE: 18 BRPM

## 2018-07-30 DIAGNOSIS — J98.11 ATELECTASIS OF LEFT LUNG: ICD-10-CM

## 2018-07-30 DIAGNOSIS — R07.81 PLEURITIC CHEST PAIN: ICD-10-CM

## 2018-07-30 DIAGNOSIS — K21.9 GASTROESOPHAGEAL REFLUX DISEASE WITHOUT ESOPHAGITIS: ICD-10-CM

## 2018-07-30 DIAGNOSIS — R07.89 ATYPICAL CHEST PAIN: ICD-10-CM

## 2018-07-30 LAB
ALBUMIN SERPL-MCNC: 4 G/DL (ref 3.5–5.7)
ALBUMIN UR-MCNC: NEGATIVE MG/DL
ALP SERPL-CCNC: 64 U/L (ref 34–104)
ALT SERPL W P-5'-P-CCNC: 16 U/L (ref 7–52)
ANION GAP SERPL CALCULATED.3IONS-SCNC: 10 MMOL/L (ref 3–14)
APPEARANCE UR: CLEAR
AST SERPL W P-5'-P-CCNC: 14 U/L (ref 13–39)
B-HCG SERPL-ACNC: <1 IU/L
BASOPHILS # BLD AUTO: 0.1 10E9/L (ref 0–0.2)
BASOPHILS NFR BLD AUTO: 0.6 %
BILIRUB SERPL-MCNC: 0.3 MG/DL (ref 0.3–1)
BILIRUB UR QL STRIP: NEGATIVE
BUN SERPL-MCNC: 11 MG/DL (ref 7–25)
CALCIUM SERPL-MCNC: 9.4 MG/DL (ref 8.6–10.3)
CHLORIDE SERPL-SCNC: 101 MMOL/L (ref 98–107)
CO2 SERPL-SCNC: 24 MMOL/L (ref 21–31)
COLOR UR AUTO: YELLOW
CREAT SERPL-MCNC: 0.78 MG/DL (ref 0.6–1.2)
D DIMER PPP DDU-MCNC: <200 NG/ML D-DU (ref 0–230)
DIFFERENTIAL METHOD BLD: NORMAL
EOSINOPHIL # BLD AUTO: 0.2 10E9/L (ref 0–0.7)
EOSINOPHIL NFR BLD AUTO: 1.7 %
ERYTHROCYTE [DISTWIDTH] IN BLOOD BY AUTOMATED COUNT: 13.7 % (ref 10–15)
GFR SERPL CREATININE-BSD FRML MDRD: 87 ML/MIN/1.7M2
GLUCOSE SERPL-MCNC: 98 MG/DL (ref 70–105)
GLUCOSE UR STRIP-MCNC: NEGATIVE MG/DL
HCT VFR BLD AUTO: 44 % (ref 35–47)
HGB BLD-MCNC: 14.3 G/DL (ref 11.7–15.7)
HGB UR QL STRIP: ABNORMAL
IMM GRANULOCYTES # BLD: 0 10E9/L (ref 0–0.4)
IMM GRANULOCYTES NFR BLD: 0.4 %
KETONES UR STRIP-MCNC: NEGATIVE MG/DL
LEUKOCYTE ESTERASE UR QL STRIP: NEGATIVE
LYMPHOCYTES # BLD AUTO: 2.4 10E9/L (ref 0.8–5.3)
LYMPHOCYTES NFR BLD AUTO: 21.7 %
MAGNESIUM SERPL-MCNC: 2 MG/DL (ref 1.9–2.7)
MCH RBC QN AUTO: 27.5 PG (ref 26.5–33)
MCHC RBC AUTO-ENTMCNC: 32.5 G/DL (ref 31.5–36.5)
MCV RBC AUTO: 85 FL (ref 78–100)
MONOCYTES # BLD AUTO: 0.9 10E9/L (ref 0–1.3)
MONOCYTES NFR BLD AUTO: 8.4 %
NEUTROPHILS # BLD AUTO: 7.4 10E9/L (ref 1.6–8.3)
NEUTROPHILS NFR BLD AUTO: 67.2 %
NITRATE UR QL: NEGATIVE
PH UR STRIP: 5 PH (ref 5–9)
PLATELET # BLD AUTO: 279 10E9/L (ref 150–450)
POTASSIUM SERPL-SCNC: 4 MMOL/L (ref 3.5–5.1)
PROT SERPL-MCNC: 6.3 G/DL (ref 6.4–8.9)
RBC # BLD AUTO: 5.2 10E12/L (ref 3.8–5.2)
RBC #/AREA URNS AUTO: NORMAL /HPF
SODIUM SERPL-SCNC: 135 MMOL/L (ref 134–144)
SOURCE: ABNORMAL
SP GR UR STRIP: 1.01 (ref 1–1.03)
TROPONIN I SERPL-MCNC: <0.03 UG/L (ref 0–0.03)
UROBILINOGEN UR STRIP-ACNC: 0.2 EU/DL (ref 0.2–1)
WBC # BLD AUTO: 11 10E9/L (ref 4–11)
WBC #/AREA URNS AUTO: NORMAL /HPF

## 2018-07-30 PROCEDURE — 80053 COMPREHEN METABOLIC PANEL: CPT | Performed by: PHYSICIAN ASSISTANT

## 2018-07-30 PROCEDURE — 25000132 ZZH RX MED GY IP 250 OP 250 PS 637: Performed by: PHYSICIAN ASSISTANT

## 2018-07-30 PROCEDURE — 99284 EMERGENCY DEPT VISIT MOD MDM: CPT | Mod: Z6 | Performed by: PHYSICIAN ASSISTANT

## 2018-07-30 PROCEDURE — 25000125 ZZHC RX 250: Performed by: PHYSICIAN ASSISTANT

## 2018-07-30 PROCEDURE — 36415 COLL VENOUS BLD VENIPUNCTURE: CPT | Performed by: PHYSICIAN ASSISTANT

## 2018-07-30 PROCEDURE — 85025 COMPLETE CBC W/AUTO DIFF WBC: CPT | Performed by: PHYSICIAN ASSISTANT

## 2018-07-30 PROCEDURE — 81001 URINALYSIS AUTO W/SCOPE: CPT | Performed by: PHYSICIAN ASSISTANT

## 2018-07-30 PROCEDURE — 93005 ELECTROCARDIOGRAM TRACING: CPT | Performed by: PHYSICIAN ASSISTANT

## 2018-07-30 PROCEDURE — 83735 ASSAY OF MAGNESIUM: CPT | Performed by: PHYSICIAN ASSISTANT

## 2018-07-30 PROCEDURE — 93010 ELECTROCARDIOGRAM REPORT: CPT | Performed by: INTERNAL MEDICINE

## 2018-07-30 PROCEDURE — 85379 FIBRIN DEGRADATION QUANT: CPT | Performed by: PHYSICIAN ASSISTANT

## 2018-07-30 PROCEDURE — 84702 CHORIONIC GONADOTROPIN TEST: CPT | Performed by: PHYSICIAN ASSISTANT

## 2018-07-30 PROCEDURE — 84484 ASSAY OF TROPONIN QUANT: CPT | Performed by: PHYSICIAN ASSISTANT

## 2018-07-30 PROCEDURE — 99285 EMERGENCY DEPT VISIT HI MDM: CPT | Mod: 25 | Performed by: PHYSICIAN ASSISTANT

## 2018-07-30 PROCEDURE — 71046 X-RAY EXAM CHEST 2 VIEWS: CPT

## 2018-07-30 RX ORDER — ALUMINA, MAGNESIA, AND SIMETHICONE 2400; 2400; 240 MG/30ML; MG/30ML; MG/30ML
15 SUSPENSION ORAL ONCE
Status: COMPLETED | OUTPATIENT
Start: 2018-07-30 | End: 2018-07-30

## 2018-07-30 RX ORDER — CALCIUM CARBONATE 500 MG/1
1 TABLET, CHEWABLE ORAL 2 TIMES DAILY
COMMUNITY

## 2018-07-30 RX ORDER — FAMOTIDINE 20 MG/1
20 TABLET, FILM COATED ORAL 2 TIMES DAILY
Status: DISCONTINUED | OUTPATIENT
Start: 2018-07-30 | End: 2018-07-30 | Stop reason: HOSPADM

## 2018-07-30 RX ORDER — SIMETHICONE 125 MG
1 CAPSULE ORAL
Qty: 60 CAPSULE | Refills: 0 | Status: SHIPPED | OUTPATIENT
Start: 2018-07-30 | End: 2019-05-23

## 2018-07-30 RX ORDER — IBUPROFEN 800 MG/1
800 TABLET, FILM COATED ORAL EVERY 8 HOURS PRN
Qty: 60 TABLET | Refills: 0 | Status: SHIPPED | OUTPATIENT
Start: 2018-07-30 | End: 2018-08-07

## 2018-07-30 RX ADMIN — ALUMINUM HYDROXIDE, MAGNESIUM HYDROXIDE, AND DIMETHICONE 15 ML: 400; 400; 40 SUSPENSION ORAL at 18:59

## 2018-07-30 RX ADMIN — FAMOTIDINE 20 MG: 20 TABLET ORAL at 18:58

## 2018-07-30 RX ADMIN — LIDOCAINE HYDROCHLORIDE 15 ML: 20 SOLUTION ORAL; TOPICAL at 18:59

## 2018-07-30 ASSESSMENT — ENCOUNTER SYMPTOMS
CHILLS: 0
DIFFICULTY URINATING: 0
ABDOMINAL PAIN: 0
HEADACHES: 0
ARTHRALGIAS: 0
CONFUSION: 0
NECK STIFFNESS: 0
DIARRHEA: 0
CONSTIPATION: 0
COLOR CHANGE: 0
SHORTNESS OF BREATH: 0
FEVER: 0
COUGH: 0
NAUSEA: 0
EYE REDNESS: 0
VOMITING: 0

## 2018-07-30 NOTE — ED AVS SNAPSHOT
[facility]    [address]    [place] [address]    Phone:  [phone]    Fax:  [phone]                                       Shaista Esqueda   MRN: [id number]    Department:  [facility]   Date of Visit:  7/30/2018           After Visit Summary Signature Page     I have received my discharge instructions, and my questions have been answered. I have discussed any challenges I see with this plan with the nurse or doctor.    ..........................................................................................................................................  Patient/Patient Representative Signature      ..........................................................................................................................................  Patient Representative Print Name and Relationship to Patient    ..................................................               ................................................  Date                                            Time    ..........................................................................................................................................  Reviewed by Signature/Title    ...................................................              ..............................................  Date                                                            Time

## 2018-07-30 NOTE — ED PROVIDER NOTES
History   No chief complaint on file.    HPI Comments: This is a 28-year-old female who reports that she has been having some chest pain on and off over the past week.  Primarily notices in the morning and it seems to let up somewhat during the day.  She describes as being in the sternal area.  She has been trying some Tums because the friend of hers told her this might be reflux but this does not seem to be helping.  Eyes any nausea or vomiting no shortness of breath or lightheadedness or dizziness.  She appears in no apparent distress.  Any fever or chills.  Eyes any cough or sore throat.    The history is provided by the patient.     Shaista     Problem List:    Patient Active Problem List    Diagnosis Date Noted     Family history of malignant neoplasm of breast 01/30/2018     Priority: Medium     Family history of ischemic heart disease 01/30/2018     Priority: Medium     Contraceptive management 08/08/2012     Priority: Medium     Dysmenorrhea 08/22/2011     Priority: Medium     Encounter for sterilization 08/22/2011     Priority: Medium     Surveillance of previously prescribed contraceptive pill 05/25/2010     Priority: Medium     Temporomandibular joint disorder 05/25/2010     Priority: Medium        Past Medical History:    History reviewed. No pertinent past medical history.    Past Surgical History:    Past Surgical History:   Procedure Laterality Date     OTHER SURGICAL HISTORY      78487.0,PAST SURGICAL HISTORY,None       Family History:    Family History   Problem Relation Age of Onset     Family History Negative Mother      Good Health     Thyroid Disease Mother      Thyroid Disease     Breast Cancer Maternal Aunt 60     Cancer-breast     HEART DISEASE Maternal Grandfather      Heart Disease,Family History Coronary Heart Disease male < 55 - m uncle, mgf     Breast Cancer Maternal Aunt 60     Cancer-breast     Prostate Cancer Maternal Uncle      Cancer-prostate     Thyroid Disease Maternal Uncle      " Thyroid Disease     Seizure Disorder Maternal Aunt      Seizures     HEART DISEASE Paternal Uncle 54     Heart Disease,MI     Ovarian Cancer No family hx of      Cancer-ovarian     Anesthesia Reaction No family hx of      Anesthesia Problem     Blood Disease No family hx of      Blood Disease     Other - See Comments No family hx of      Stroke     Colon Cancer No family hx of      Cancer-colon     Hypertension No family hx of      Hypertension     Hyperlipidemia No family hx of      Hyperlipidemia       Social History:  Marital Status:  Single [1]  Social History   Substance Use Topics     Smoking status: Current Every Day Smoker     Packs/day: 0.25     Types: Cigarettes     Last attempt to quit: 4/19/2015     Smokeless tobacco: Never Used     Alcohol use Yes      Comment: Alcoholic Drinks/day: Socially        Medications:      calcium carbonate (TUMS) 500 MG chewable tablet         Review of Systems   Constitutional: Negative for chills and fever.   HENT: Negative for congestion.    Eyes: Negative for redness.   Respiratory: Negative for cough and shortness of breath.    Cardiovascular: Positive for chest pain.        Anterior chest wall pain.  Pleuritic in nature.   Gastrointestinal: Negative for abdominal pain, constipation, diarrhea, nausea and vomiting.   Genitourinary: Negative for difficulty urinating.   Musculoskeletal: Negative for arthralgias and neck stiffness.   Skin: Negative for color change.   Neurological: Negative for headaches.   Psychiatric/Behavioral: Negative for confusion.       Physical Exam   BP: 124/83  Pulse: 94  Heart Rate: 94  Temp: 98.4  F (36.9  C)  Height: 165.1 cm (5' 5\")  Weight: 92.5 kg (204 lb)  SpO2: 99 %      Physical Exam   Constitutional: She is oriented to person, place, and time. No distress.   HENT:   Head: Atraumatic.   Mouth/Throat: Oropharynx is clear and moist. No oropharyngeal exudate.   Eyes: Pupils are equal, round, and reactive to light. No scleral icterus. "   Cardiovascular: Normal heart sounds and intact distal pulses.    Pulmonary/Chest: Breath sounds normal. No respiratory distress.   Lung sounds are clear.  SaO2 is 99% on room air.   Abdominal: Soft. Bowel sounds are normal. There is no tenderness.   Musculoskeletal: She exhibits no edema or tenderness.   Neurological: She is alert and oriented to person, place, and time.   Skin: Skin is warm. No rash noted. She is not diaphoretic.       ED Course     ED Course     Procedures               EKG Interpretation:      Interpreted by Lukasz Quiñonez  Time reviewed: 1517  Symptoms at time of EKG: atypical chest pain   Rhythm: normal sinus with sinus arrhythmia  Rate: normal  Axis: normal  Ectopy: none  Conduction: normal  ST Segments/ T Waves: No ST-T wave changes  Q Waves: none  Comparison to prior: No old EKG available    Clinical Impression: normal EKG             Results for orders placed or performed during the hospital encounter of 07/30/18 (from the past 24 hour(s))   CBC with platelets differential   Result Value Ref Range    WBC 11.0 4.0 - 11.0 10e9/L    RBC Count 5.20 3.8 - 5.2 10e12/L    Hemoglobin 14.3 11.7 - 15.7 g/dL    Hematocrit 44.0 35.0 - 47.0 %    MCV 85 78 - 100 fl    MCH 27.5 26.5 - 33.0 pg    MCHC 32.5 31.5 - 36.5 g/dL    RDW 13.7 10.0 - 15.0 %    Platelet Count 279 150 - 450 10e9/L    Diff Method Automated Method     % Neutrophils 67.2 %    % Lymphocytes 21.7 %    % Monocytes 8.4 %    % Eosinophils 1.7 %    % Basophils 0.6 %    % Immature Granulocytes 0.4 %    Absolute Neutrophil 7.4 1.6 - 8.3 10e9/L    Absolute Lymphocytes 2.4 0.8 - 5.3 10e9/L    Absolute Monocytes 0.9 0.0 - 1.3 10e9/L    Absolute Eosinophils 0.2 0.0 - 0.7 10e9/L    Absolute Basophils 0.1 0.0 - 0.2 10e9/L    Abs Immature Granulocytes 0.0 0 - 0.4 10e9/L   Comprehensive metabolic panel   Result Value Ref Range    Sodium 135 134 - 144 mmol/L    Potassium 4.0 3.5 - 5.1 mmol/L    Chloride 101 98 - 107 mmol/L    Carbon Dioxide 24 21 -  31 mmol/L    Anion Gap 10 3 - 14 mmol/L    Glucose 98 70 - 105 mg/dL    Urea Nitrogen 11 7 - 25 mg/dL    Creatinine 0.78 0.60 - 1.20 mg/dL    GFR Estimate 87 >60 mL/min/1.7m2    GFR Estimate If Black >90 >60 mL/min/1.7m2    Calcium 9.4 8.6 - 10.3 mg/dL    Bilirubin Total 0.3 0.3 - 1.0 mg/dL    Albumin 4.0 3.5 - 5.7 g/dL    Protein Total 6.3 (L) 6.4 - 8.9 g/dL    Alkaline Phosphatase 64 34 - 104 U/L    ALT 16 7 - 52 U/L    AST 14 13 - 39 U/L   HCG quantitative pregnancy (blood)   Result Value Ref Range    HCG Quantitative Serum <1 IU/L   Troponin I (now)   Result Value Ref Range    Troponin I ES <0.030 0.000 - 0.034 ug/L   Magnesium   Result Value Ref Range    Magnesium 2.0 1.9 - 2.7 mg/dL   D-Dimer ([facility])   Result Value Ref Range    D-Dimer ng/mL <200 0 - 230 ng/ml D-DU   XR Chest 2 Views    Narrative    PROCEDURE:  XR CHEST 2 VW    HISTORY:  chest pain; .     COMPARISON:  None.    FINDINGS:   The cardiac silhouette is normal in size. The pulmonary vasculature is  normal.  There is some increased density in the left hilus. A left  lung infiltrate or mass cannot be excluded. No pleural effusion or  pneumothorax.      Impression    IMPRESSION:  Increased density in the left hilus. This may be on the  basis of a left lung infiltrate. Careful follow-up to clearing is  recommended.      THONG GANN MD   *UA reflex to Microscopic   Result Value Ref Range    Color Urine Yellow     Appearance Urine Clear     Glucose Urine Negative NEG^Negative mg/dL    Bilirubin Urine Negative NEG^Negative    Ketones Urine Negative NEG^Negative mg/dL    Specific Gravity Urine 1.010 1.000 - 1.030    Blood Urine Trace (A) NEG^Negative    pH Urine 5.0 5.0 - 9.0 pH    Protein Albumin Urine Negative NEG^Negative mg/dL    Urobilinogen Urine 0.2 0.2 - 1.0 EU/dL    Nitrite Urine Negative NEG^Negative    Leukocyte Esterase Urine Negative NEG^Negative    Source Midstream Urine    Urine Microscopic   Result Value Ref Range    WBC Urine 0 - 5  OTO5^0 - 5 /HPF    RBC Urine O - 2 OTO2^O - 2 /HPF       Medications   famotidine (PEPCID) tablet 20 mg (not administered)   alum & mag hydroxide-simethicone (MYLANTA ES/MAALOX  ES) suspension 15 mL (not administered)     And   lidocaine (viscous) (XYLOCAINE) 2 % solution 15 mL (not administered)       Assessments & Plan (with Medical Decision Making)     I have reviewed the nursing notes.    I have reviewed the findings, diagnosis, plan and need for follow up with the patient.      Discharge Medication List as of 7/30/2018  8:23 PM      START taking these medications    Details   ibuprofen (ADVIL/MOTRIN) 800 MG tablet Take 1 tablet (800 mg) by mouth every 8 hours as needed for moderate pain, Disp-60 tablet, R-0, Local Print      omeprazole (PRILOSEC) 20 MG CR capsule Take 1 capsule (20 mg) by mouth daily, Disp-30 capsule, R-0, Local Print      Simethicone 125 MG CAPS Take 1 tablet by mouth 4 times daily (before meals and nightly), Disp-60 capsule, R-0, Local Print             Final diagnoses:   Atypical chest pain   Pleuritic chest pain   Gastroesophageal reflux disease without esophagitis   Atelectasis of left lung - VS lung mass     Afebrile.  Vital signs stable.  Atypical chest pain and pleuritic chest pain on and off over the past week.  Not really responding to Tums.  She was given oral Pepcid as well as a GI cocktail in the ER.  EKG shows normal sinus rhythm with sinus arrhythmia.  No previous EKGs for comparison.  Troponin is negative.  Dimer is negative.  This is reassuring.  CBC shows normal white blood cells with no left shift.  CMP is unremarkable.  UA is unremarkable.  ECG is negative.  Chest x-ray shows cardiac silhouette is normal in size.  The pulmonary vasculature is normal.  Some increased density in the left Zi.  Left lung infiltrate versus mass cannot be excluded.  Pleural effusion or pneumothorax.  Discussed these findings with the patient.  Discussed that this possible lung mass versus  infiltrate could be causing her pleuritic chest pain and will require further evaluation with her primary.  She is feeling much better after the above treatment with Pepcid and GI cocktail.  It is not unreasonable that she could have some GERD manifestations as well.  Discussed continued monitoring and close follow-up with her primary within 5 days for further evaluation.  Rx for simethicone as well as Prilosec daily.  Rx as well for the Motrin for the pleuritic chest pain.  Follow-up sooner if there are any other concerns problems or questions  7/30/2018   [facility] AND [facility]     Lukasz Guerra PA-C  07/30/18 8501

## 2018-07-30 NOTE — ED TRIAGE NOTES
COLUMBIA-SUICIDE SEVERITY RATING SCALE   Screen with Triage Points for Emergency Department      Ask questions that are bolded and underlined.   Past  month   Ask Questions 1 and 2 YES NO   1)  Have you wished you were dead or wished you could go to sleep and not wake up?   no   2)  Have you actually had any thoughts of killing yourself?   no   If YES to 2, ask questions 3, 4, 5, and 6.  If NO to 2, go directly to question 6.   3)  Have you been thinking about how you might do this?   E.g.  I thought about taking an overdose but I never made a specific plan as to when where or how I would actually do it .and I would never go through with it.       4)  Have you had these thoughts and had some intention of acting on them?   As opposed to  I have the thoughts but I definitely will not do anything about them.    no   5)  Have you started to work out or worked out the details of how to kill yourself? Do you intend to carry out this plan?   no   6)  Have you ever done anything, started to do anything, or prepared to do anything to end your life?  Examples: Collected pills, obtained a gun, gave away valuables, wrote a will or suicide note, took out pills but didn t swallow any, held a gun but changed your mind or it was grabbed from your hand, went to the roof but didn t jump; or actually took pills, tried to shoot yourself, cut yourself, tried to hang yourself, etc.    If YES, ask: Was this within the past three months?  Lifetime         Past 3 Months     no   Item 1:  Behavioral Health Referral at Discharge  Item 2:  Behavioral Health Referral at Discharge   Item 3:  Behavioral Health Consult (Psychiatric Nurse/) and consider Patient Safety Precautions  Item 4:  Immediate Notification of Physician and/or Behavioral Health and Patient Safety Precautions   Item 5:  Immediate Notification of Physician and/or Behavioral Health and Patient Safety Precautions  Item 6:  Over 3 months ago: Behavioral Health Consult  (Psychiatric Nurse/) and consider Patient Safety Precautions  OR  Item 6:  3 months ago or less: Immediate Notification of Physician and/or Behavioral Health and Patient Safety Precautions

## 2018-07-30 NOTE — ED TRIAGE NOTES
States she awoke with pain in her chest about a week ago. States tums helped it Saturday but now the tums are not helping. Pain is mid anterior chest over sternum. Pink, warm and dry. The pain is constant and is not accompanied by nausea or shortness of breath or diaphoresis.

## 2018-07-30 NOTE — ED AVS SNAPSHOT
[facility]    [address]    [place] [address]    Phone:  [phone]    Fax:  [phone]                                       Shaista Esqueda   MRN: [id number]    Department:  [facility]   Date of Visit:  7/30/2018           Patient Information     Date Of Birth          1990        Your diagnoses for this visit were:     Atypical chest pain     Pleuritic chest pain     Gastroesophageal reflux disease without esophagitis     Atelectasis of left lung VS lung mass       You were seen by Lukasz Guerra PA-C.      Follow-up Information     Follow up with Venita Montgomery APRN CNP. Schedule an appointment as soon as possible for a visit in 5 days.    Specialty:  Nurse Practitioner    Contact information:    [address]  [place] [id number] [phone]          Discharge Instructions         Tips to Control Acid Reflux    To control acid reflux, you ll need to make some basic diet and lifestyle changes. The simple steps outlined below may be all you ll need to ease discomfort.  Watch what you eat    Avoid fatty foods and spicy foods.    Eat fewer acidic foods, such as citrus and tomato-based foods. These can increase symptoms.    Limit drinking alcohol, caffeine, and fizzy beverages. All increase acid reflux.    Try limiting chocolate, peppermint, and spearmint. These can worsen acid reflux in some people.  Watch when you eat    Avoid lying down for 3 hours after eating.    Do not snack before going to bed.  Raise your head  Raising your head and upper body by 4 to 6 inches helps limit reflux when you re lying down. Put blocks under the head of your bed frame to raise it.  Other changes    Lose weight, if you need to    Don t exercise near bedtime    Avoid tight-fitting clothes    Limit aspirin and ibuprofen    Stop smoking   Date Last Reviewed: 7/1/2016 2000-2017 [employer]. [address] [place] [address], [place] [address].  All rights reserved. This information is not intended as a substitute for professional medical care. Always follow your healthcare professional's instructions.          Discharge References/Attachments     GERD (ADULT) (ENGLISH)    ATELECTASIS (ENGLISH)    PLEURISY (ENGLISH)      24 Hour Appointment Hotline     To schedule an appointment at [facility], please call [phone]. If you don't have a family doctor or clinic, we will help you find one. [facility] are conveniently located to serve the needs of you and your family.           Review of your medicines      START taking        Dose / Directions Last dose taken    ibuprofen 800 MG tablet   Commonly known as:  ADVIL/MOTRIN   Dose:  800 mg   Quantity:  60 tablet        Take 1 tablet (800 mg) by mouth every 8 hours as needed for moderate pain   Refills:  0        omeprazole 20 MG CR capsule   Commonly known as:  priLOSEC   Dose:  20 mg   Quantity:  30 capsule        Take 1 capsule (20 mg) by mouth daily   Refills:  0        Simethicone 125 MG Caps   Dose:  1 tablet   Quantity:  60 capsule        Take 1 tablet by mouth 4 times daily (before meals and nightly)   Refills:  0          Our records show that you are taking the medicines listed below. If these are incorrect, please call your family doctor or clinic.        Dose / Directions Last dose taken    calcium carbonate 500 MG chewable tablet   Commonly known as:  TUMS   Dose:  1 chew tab        Take 1 chew tab by mouth 2 times daily   Refills:  0                Prescriptions were sent or printed at these locations (3 Prescriptions)                   Other Prescriptions                Printed at Department/Unit printer (3 of 3)         ibuprofen (ADVIL/MOTRIN) 800 MG tablet               omeprazole (PRILOSEC) 20 MG CR capsule               Simethicone 125 MG CAPS                Procedures and tests performed during your visit     *UA reflex to Microscopic    CBC with platelets differential     "Comprehensive metabolic panel    D-Dimer ([facility])    EKG 12 lead    HCG quantitative pregnancy (blood)    Magnesium    Troponin I (now)    Urine Microscopic    XR Chest 2 Views      Orders Needing Specimen Collection     None      Pending Results     No orders found from 2018 to 2018.            Pending Culture Results     No orders found from 2018 to 2018.            Pending Results Instructions     If you had any lab results that were not finalized at the time of your Discharge, you can call the ED Lab Result RN at [phone]. You will be contacted by this team for any positive Lab results or changes in treatment. The nurses are available 7 days a week from 10A to 6:30P.  You can leave a message 24 hours per day and they will return your call.        Thank you for choosing [facility]       Thank you for choosing [facility] for your care. Our goal is always to provide you with excellent care. Hearing back from our patients is one way we can continue to improve our services. Please take a few minutes to complete the written survey that you may receive in the mail after you visit with us. Thank you!        [employer] Information     [employer] lets you send messages to your doctor, view your test results, renew your prescriptions, schedule appointments and more. To sign up, go to [email or web address] . Click on \"Log in\" on the left side of the screen, which will take you to the Welcome page. Then click on \"Sign up Now\" on the right side of the page.     You will be asked to enter the access code listed below, as well as some personal information. Please follow the directions to create your username and password.     Your access code is: [email or web address]  Expires: 10/28/2018  8:23 PM     Your access code will  in 90 days. If you need help or a new code, please call your [facility] clinic or [phone].        Care EveryWhere ID     This is your Care EveryWhere ID. This could be used by other " organizations to access your [facility] medical records  [id number]        Equal Access to Services     CARMEN JOY : Hilda Rivera, iva weber, ruslan arteaga. So [facility] [phone].    ATENCIÓN: Si habla español, tiene a driscoll disposición servicios gratuitos de asistencia lingüística. Llame al [phone].    We comply with applicable federal civil rights laws and [place] laws. We do not discriminate on the basis of race, color, national origin, age, disability, sex, sexual orientation, or gender identity.            After Visit Summary       This is your record. Keep this with you and show to your community pharmacist(s) and doctor(s) at your next visit.

## 2018-07-31 NOTE — DISCHARGE INSTRUCTIONS
Tips to Control Acid Reflux    To control acid reflux, you ll need to make some basic diet and lifestyle changes. The simple steps outlined below may be all you ll need to ease discomfort.  Watch what you eat    Avoid fatty foods and spicy foods.    Eat fewer acidic foods, such as citrus and tomato-based foods. These can increase symptoms.    Limit drinking alcohol, caffeine, and fizzy beverages. All increase acid reflux.    Try limiting chocolate, peppermint, and spearmint. These can worsen acid reflux in some people.  Watch when you eat    Avoid lying down for 3 hours after eating.    Do not snack before going to bed.  Raise your head  Raising your head and upper body by 4 to 6 inches helps limit reflux when you re lying down. Put blocks under the head of your bed frame to raise it.  Other changes    Lose weight, if you need to    Don t exercise near bedtime    Avoid tight-fitting clothes    Limit aspirin and ibuprofen    Stop smoking   Date Last Reviewed: 7/1/2016 2000-2017 [employer]. [address] [place] [address], [place] [address]. All rights reserved. This information is not intended as a substitute for professional medical care. Always follow your healthcare professional's instructions.

## 2018-08-03 ENCOUNTER — OFFICE VISIT (OUTPATIENT)
Dept: FAMILY MEDICINE | Facility: OTHER | Age: 28
End: 2018-08-03
Attending: FAMILY MEDICINE
Payer: COMMERCIAL

## 2018-08-03 ENCOUNTER — HOSPITAL ENCOUNTER (OUTPATIENT)
Dept: CT IMAGING | Facility: OTHER | Age: 28
Discharge: HOME | End: 2018-08-03
Attending: FAMILY MEDICINE | Admitting: FAMILY MEDICINE
Payer: COMMERCIAL

## 2018-08-03 VITALS
DIASTOLIC BLOOD PRESSURE: 90 MMHG | WEIGHT: 206.8 LBS | SYSTOLIC BLOOD PRESSURE: 130 MMHG | BODY MASS INDEX: 34.41 KG/M2 | HEART RATE: 86 BPM

## 2018-08-03 DIAGNOSIS — R07.89 ATYPICAL CHEST PAIN: Primary | ICD-10-CM

## 2018-08-03 DIAGNOSIS — R93.89 CHEST X-RAY ABNORMALITY: ICD-10-CM

## 2018-08-03 PROCEDURE — 25000125 ZZHC RX 250: Performed by: FAMILY MEDICINE

## 2018-08-03 PROCEDURE — 99214 OFFICE O/P EST MOD 30 MIN: CPT | Performed by: FAMILY MEDICINE

## 2018-08-03 PROCEDURE — 71260 CT THORAX DX C+: CPT

## 2018-08-03 RX ADMIN — IOHEXOL 100 ML: 350 INJECTION, SOLUTION INTRAVENOUS at 15:30

## 2018-08-03 ASSESSMENT — ENCOUNTER SYMPTOMS
DIAPHORESIS: 0
SHORTNESS OF BREATH: 0
ADENOPATHY: 0
BRUISES/BLEEDS EASILY: 0
ABDOMINAL PAIN: 0
FATIGUE: 0

## 2018-08-03 ASSESSMENT — PAIN SCALES - GENERAL: PAINLEVEL: MILD PAIN (3)

## 2018-08-03 NOTE — PROGRESS NOTES
SUBJECTIVE:   Shaista Esqueda is a 28 year old female who presents to clinic today for the following health issues:    HPI  Emergency department follow up for chest pain.  Was seen on 7/30.  Notes reviewed.  Had a chest x ray with a possible mass.  In the emergency department had upper sternal chest pain.  Was given a PPI, helped with this pain, but now she is having pains in left lateral lower chest wall.  No trauma at all.  Mild cough.  No shortness of breath.  Normal exercise tolerance.  No fevers.  Is tired.  Slept 10 hours last night.  Aunt had lung cancer.    Results for orders placed or performed during the hospital encounter of 07/30/18   XR Chest 2 Views    Narrative    PROCEDURE:  XR CHEST 2 VW    HISTORY:  chest pain; .     COMPARISON:  None.    FINDINGS:   The cardiac silhouette is normal in size. The pulmonary vasculature is  normal.  There is some increased density in the left hilus. A left  lung infiltrate or mass cannot be excluded. No pleural effusion or  pneumothorax.      Impression    IMPRESSION:  Increased density in the left hilus. This may be on the  basis of a left lung infiltrate. Careful follow-up to clearing is  recommended.      THONG GANN MD   CBC with platelets differential   Result Value Ref Range    WBC 11.0 4.0 - 11.0 10e9/L    RBC Count 5.20 3.8 - 5.2 10e12/L    Hemoglobin 14.3 11.7 - 15.7 g/dL    Hematocrit 44.0 35.0 - 47.0 %    MCV 85 78 - 100 fl    MCH 27.5 26.5 - 33.0 pg    MCHC 32.5 31.5 - 36.5 g/dL    RDW 13.7 10.0 - 15.0 %    Platelet Count 279 150 - 450 10e9/L    Diff Method Automated Method     % Neutrophils 67.2 %    % Lymphocytes 21.7 %    % Monocytes 8.4 %    % Eosinophils 1.7 %    % Basophils 0.6 %    % Immature Granulocytes 0.4 %    Absolute Neutrophil 7.4 1.6 - 8.3 10e9/L    Absolute Lymphocytes 2.4 0.8 - 5.3 10e9/L    Absolute Monocytes 0.9 0.0 - 1.3 10e9/L    Absolute Eosinophils 0.2 0.0 - 0.7 10e9/L    Absolute Basophils 0.1 0.0 - 0.2 10e9/L    Abs  Immature Granulocytes 0.0 0 - 0.4 10e9/L   Comprehensive metabolic panel   Result Value Ref Range    Sodium 135 134 - 144 mmol/L    Potassium 4.0 3.5 - 5.1 mmol/L    Chloride 101 98 - 107 mmol/L    Carbon Dioxide 24 21 - 31 mmol/L    Anion Gap 10 3 - 14 mmol/L    Glucose 98 70 - 105 mg/dL    Urea Nitrogen 11 7 - 25 mg/dL    Creatinine 0.78 0.60 - 1.20 mg/dL    GFR Estimate 87 >60 mL/min/1.7m2    GFR Estimate If Black >90 >60 mL/min/1.7m2    Calcium 9.4 8.6 - 10.3 mg/dL    Bilirubin Total 0.3 0.3 - 1.0 mg/dL    Albumin 4.0 3.5 - 5.7 g/dL    Protein Total 6.3 (L) 6.4 - 8.9 g/dL    Alkaline Phosphatase 64 34 - 104 U/L    ALT 16 7 - 52 U/L    AST 14 13 - 39 U/L   *UA reflex to Microscopic   Result Value Ref Range    Color Urine Yellow     Appearance Urine Clear     Glucose Urine Negative NEG^Negative mg/dL    Bilirubin Urine Negative NEG^Negative    Ketones Urine Negative NEG^Negative mg/dL    Specific Gravity Urine 1.010 1.000 - 1.030    Blood Urine Trace (A) NEG^Negative    pH Urine 5.0 5.0 - 9.0 pH    Protein Albumin Urine Negative NEG^Negative mg/dL    Urobilinogen Urine 0.2 0.2 - 1.0 EU/dL    Nitrite Urine Negative NEG^Negative    Leukocyte Esterase Urine Negative NEG^Negative    Source Midstream Urine    HCG quantitative pregnancy (blood)   Result Value Ref Range    HCG Quantitative Serum <1 IU/L   Troponin I (now)   Result Value Ref Range    Troponin I ES <0.030 0.000 - 0.034 ug/L   Magnesium   Result Value Ref Range    Magnesium 2.0 1.9 - 2.7 mg/dL   D-Dimer (HI,GH)   Result Value Ref Range    D-Dimer ng/mL <200 0 - 230 ng/ml D-DU   Urine Microscopic   Result Value Ref Range    WBC Urine 0 - 5 OTO5^0 - 5 /HPF    RBC Urine O - 2 OTO2^O - 2 /HPF   EKG 12 lead    Narrative    EKG Interpretation:    Rhythm: Normal Sinus with sinus arrhythmia  Rate: 82  Axis: Normal  QRS interval: Normal  Conduction: Normal  ST Segments: Normal  QT interval: Normal  APC's Present: No  VPC's Present: No    Impression: Normal EKG.  No  comparison available.    Malinda Mena DO  Internal Medicine         Patient Active Problem List    Diagnosis Date Noted     Family history of malignant neoplasm of breast 01/30/2018     Priority: Medium     Family history of ischemic heart disease 01/30/2018     Priority: Medium     Contraceptive management 08/08/2012     Priority: Medium     Dysmenorrhea 08/22/2011     Priority: Medium     Encounter for sterilization 08/22/2011     Priority: Medium     Surveillance of previously prescribed contraceptive pill 05/25/2010     Priority: Medium     Temporomandibular joint disorder 05/25/2010     Priority: Medium     Past Surgical History:   Procedure Laterality Date     OTHER SURGICAL HISTORY      61211.0,PAST SURGICAL HISTORY,None     Social History   Substance Use Topics     Smoking status: Current Every Day Smoker     Packs/day: 0.25     Types: Cigarettes     Last attempt to quit: 4/19/2015     Smokeless tobacco: Never Used     Alcohol use Yes      Comment: Alcoholic Drinks/day: Socially     Current Outpatient Prescriptions   Medication Sig Dispense Refill     calcium carbonate (TUMS) 500 MG chewable tablet Take 1 chew tab by mouth 2 times daily       ibuprofen (ADVIL/MOTRIN) 800 MG tablet Take 1 tablet (800 mg) by mouth every 8 hours as needed for moderate pain 60 tablet 0     omeprazole (PRILOSEC) 20 MG CR capsule Take 1 capsule (20 mg) by mouth daily 30 capsule 0     Simethicone 125 MG CAPS Take 1 tablet by mouth 4 times daily (before meals and nightly) 60 capsule 0     Allergies   Allergen Reactions     Amoxicillin Hives     Cefaclor Unknown     Sulfamethoxazole-Trimethoprim Unknown       Review of Systems   Constitutional: Negative for diaphoresis and fatigue.   Respiratory: Negative for shortness of breath.    Cardiovascular: Positive for chest pain.   Gastrointestinal: Negative for abdominal pain.   Hematological: Negative for adenopathy. Does not bruise/bleed easily.        OBJECTIVE:     /90  Pulse  86  Wt 206 lb 12.8 oz (93.8 kg)  BMI 34.41 kg/m2  Body mass index is 34.41 kg/(m^2).  Physical Exam   Constitutional: She is oriented to person, place, and time. She appears well-developed. No distress.   Cardiovascular: Normal rate, regular rhythm and normal heart sounds.  Exam reveals no gallop and no friction rub.    No murmur heard.  Pulmonary/Chest: Effort normal. No respiratory distress. She has no wheezes. She has no rales.   Mild tenderness in the left 9th rib along anterior axillary line.   Neurological: She is alert and oriented to person, place, and time. No cranial nerve deficit.   Skin: She is not diaphoretic.   Psychiatric: She has a normal mood and affect. Her behavior is normal. Thought content normal.       Diagnostic Test Results:  none     ASSESSMENT/PLAN:         (R07.89) Atypical chest pain  (primary encounter diagnosis)  Comment: likely costonchondritis  Plan: Has ibuprofen for this, if not improving then consider prednisone     (R93.8) Chest x-ray abnormality  Comment: new  Plan: CT Chest w/o Contrast        This could be lymphoma, sarcoidosis, blasto, doubt bacterial infection, given she is not acutely ill clinically.        Khari Burdick MD  [facility]

## 2018-08-03 NOTE — MR AVS SNAPSHOT
"              After Visit Summary   8/3/2018    Shaista Esqueda    MRN: [id number]           Patient Information     Date Of Birth          1990        Visit Information        Provider Department      8/3/2018 1:45 PM Khari Burdick MD [facility]        Today's Diagnoses     Atypical chest pain    -  1    Chest x-ray abnormality           Follow-ups after your visit        Follow-up notes from your care team     Return if symptoms worsen or fail to improve.      Your next 10 appointments already scheduled     Aug 10, 2018 12:45 PM CDT   SHORT with MAGDA Aviles CNP   [facility] ([facility])    [address]  [place] [address] [phone]              Future tests that were ordered for you today     Open Future Orders        Priority Expected Expires Ordered    CT Chest w/o Contrast Routine  8/3/2019 8/3/2018            Who to contact     If you have questions or need follow up information about today's clinic visit or your schedule please contact [facility] directly at [phone].  Normal or non-critical lab and imaging results will be communicated to you by [email or web address], letter or phone within 4 business days after the clinic has received the results. If you do not hear from us within 7 days, please contact the clinic through [employer] or phone. If you have a critical or abnormal lab result, we will notify you by phone as soon as possible.  Submit refill requests through [employer] or call your pharmacy and they will forward the refill request to us. Please allow 3 business days for your refill to be completed.          Additional Information About Your Visit        [email or web address] Information     [employer] lets you send messages to your doctor, view your test results, renew your prescriptions, schedule appointments and more. To sign up, go to [email or web address] . Click on \"Log in\" on the left side of the " "screen, which will take you to the Welcome page. Then click on \"Sign up Now\" on the right side of the page.     You will be asked to enter the access code listed below, as well as some personal information. Please follow the directions to create your username and password.     Your access code is: [email or web address]  Expires: 10/28/2018  8:23 PM     Your access code will  in 90 days. If you need help or a new code, please call your [facility] or [phone].        Care EveryWhere ID     This is your Care EveryWhere ID. This could be used by other organizations to access your [facility] medical records  [id number]        Your Vitals Were     Pulse BMI (Body Mass Index)                86 34.41 kg/m2           Blood Pressure from Last 3 Encounters:   18 130/90   18 122/90   18 110/80    Weight from Last 3 Encounters:   18 206 lb 12.8 oz (93.8 kg)   18 204 lb (92.5 kg)   18 202 lb 9.6 oz (91.9 kg)               Primary Care Provider Office Phone # Fax #    Venita ALEXANDRA Montgomery, MAGDA [phone] [address] [facility] [address]        Equal Access to Services     LAYA JOY AH: Hadii judy ku hadasho Sosintiaali, waaxda luqadaha, qaybta kaalmada adeegyada, ruslan busby hayshameka parikh . So [facility] [phone].    ATENCIÓN: Si habla español, tiene a driscoll disposición servicios gratuitos de asistencia lingüística. Llame al [phone].    We comply with applicable federal civil rights laws and [place] laws. We do not discriminate on the basis of race, color, national origin, age, disability, sex, sexual orientation, or gender identity.            Thank you!     Thank you for choosing [facility] AND hospitals  for your care. Our goal is always to provide you with excellent care. Hearing back from our patients is one way we can continue to improve our services. Please take a few minutes to complete the written survey that you may receive in " the mail after your visit with us. Thank you!             Your Updated Medication List - Protect others around you: Learn how to safely use, store and throw away your medicines at www.disposemymeds.org.          This list is accurate as of 8/3/18  2:22 PM.  Always use your most recent med list.                   Brand Name Dispense Instructions for use Diagnosis    calcium carbonate 500 MG chewable tablet    TUMS     Take 1 chew tab by mouth 2 times daily        ibuprofen 800 MG tablet    ADVIL/MOTRIN    60 tablet    Take 1 tablet (800 mg) by mouth every 8 hours as needed for moderate pain        omeprazole 20 MG CR capsule    priLOSEC    30 capsule    Take 1 capsule (20 mg) by mouth daily        Simethicone 125 MG Caps     60 capsule    Take 1 tablet by mouth 4 times daily (before meals and nightly)

## 2018-08-03 NOTE — NURSING NOTE
Patient presents today for follow up on ER visit. Patient complains of still having chest pains.    Iqra Ortiz LPN on 8/3/2018 at 1:33 PM

## 2018-08-24 ENCOUNTER — MYC MEDICAL ADVICE (OUTPATIENT)
Dept: FAMILY MEDICINE | Facility: OTHER | Age: 28
End: 2018-08-24

## 2018-08-24 DIAGNOSIS — K21.9 GASTROESOPHAGEAL REFLUX DISEASE, ESOPHAGITIS PRESENCE NOT SPECIFIED: Primary | ICD-10-CM

## 2018-08-27 NOTE — TELEPHONE ENCOUNTER
"Refill request from patient (Jaymie) for omeprazole    Unable to located in chart where omeprazole dose in question was discussed.  Will route to PCP for direction.  Also replied via [employer] to determine pharmacy preference.    NEGATIVE CT Chest 8/3/2018 (artifact)    TIME LINE:    [employer] message:    \"Hi Venita, I was wondering if you would be able to refill my omeprazole prescription? Or if you'll need me to come in for that. I know we talked about me taking them twice a day, as a result I went through them a bit faster than I should have. Let me know what I need to do to get it refilled. Thanks!\"    Shaista Esqueda.  ____________________________________________________________  Noted ED visit notes 7/30/2018:    \" It is not unreasonable that she could have some GERD manifestations as well.  Discussed continued monitoring and close follow-up with her primary within 5 days for further evaluation.  Rx for simethicone as well as Prilosec daily.  Rx as well for the Motrin for the pleuritic chest pain.  Follow-up sooner if there are any other concerns problems or questions\"  ____________________________________________________________  Noted appointment with Khari Burdick MD 8/3/2018 got f/u atypical chest pain   PLAN:  \"Atypical chest pain  (primary encounter diagnosis)  Comment: likely costonchondritis  Plan: Has ibuprofen for this, if not improving then consider prednisone      Chest x-ray abnormality  Comment: new  Plan: CT Chest w/o Contrast        This could be lymphoma, sarcoidosis, blasto, doubt bacterial infection, given she is not acutely ill clinically.\"  Instructed toReturn if symptoms worsen or fail to improve  ___________________________________________________________________    Medication Detail      Disp Refills Start End MILENA   omeprazole (PRILOSEC) 20 MG CR capsule 30 capsule 0 7/30/2018 8/29/2018 --   Sig - Route: Take 1 capsule (20 mg) by mouth daily - Oral   Class: Local Print   Order: [id number] "         Dina Boggs RN  ....................  8/27/2018   10:07 AM

## 2018-12-15 ENCOUNTER — OFFICE VISIT (OUTPATIENT)
Dept: FAMILY MEDICINE | Facility: OTHER | Age: 28
End: 2018-12-15
Attending: NURSE PRACTITIONER
Payer: COMMERCIAL

## 2018-12-15 VITALS
OXYGEN SATURATION: 98 % | WEIGHT: 216.4 LBS | SYSTOLIC BLOOD PRESSURE: 128 MMHG | HEART RATE: 115 BPM | BODY MASS INDEX: 36.01 KG/M2 | TEMPERATURE: 98.1 F | DIASTOLIC BLOOD PRESSURE: 90 MMHG

## 2018-12-15 DIAGNOSIS — R39.15 URINARY URGENCY: Primary | ICD-10-CM

## 2018-12-15 DIAGNOSIS — N30.00 ACUTE CYSTITIS WITHOUT HEMATURIA: ICD-10-CM

## 2018-12-15 LAB
ALBUMIN UR-MCNC: NEGATIVE MG/DL
APPEARANCE UR: CLEAR
BACTERIA #/AREA URNS HPF: ABNORMAL /HPF
BILIRUB UR QL STRIP: NEGATIVE
COLOR UR AUTO: YELLOW
GLUCOSE UR STRIP-MCNC: NEGATIVE MG/DL
HGB UR QL STRIP: ABNORMAL
KETONES UR STRIP-MCNC: NEGATIVE MG/DL
LEUKOCYTE ESTERASE UR QL STRIP: ABNORMAL
NITRATE UR QL: NEGATIVE
PH UR STRIP: 6 PH (ref 5–9)
RBC #/AREA URNS AUTO: ABNORMAL /HPF
SOURCE: ABNORMAL
SP GR UR STRIP: 1.01 (ref 1–1.03)
UROBILINOGEN UR STRIP-ACNC: 0.2 EU/DL (ref 0.2–1)
WBC #/AREA URNS AUTO: ABNORMAL /HPF

## 2018-12-15 PROCEDURE — 87086 URINE CULTURE/COLONY COUNT: CPT | Performed by: NURSE PRACTITIONER

## 2018-12-15 PROCEDURE — 81001 URINALYSIS AUTO W/SCOPE: CPT | Performed by: NURSE PRACTITIONER

## 2018-12-15 PROCEDURE — 87088 URINE BACTERIA CULTURE: CPT | Performed by: NURSE PRACTITIONER

## 2018-12-15 PROCEDURE — 25000132 ZZH RX MED GY IP 250 OP 250 PS 637: Performed by: NURSE PRACTITIONER

## 2018-12-15 PROCEDURE — 99213 OFFICE O/P EST LOW 20 MIN: CPT | Performed by: NURSE PRACTITIONER

## 2018-12-15 RX ORDER — CIPROFLOXACIN 500 MG/1
500 TABLET, FILM COATED ORAL ONCE
Status: COMPLETED | OUTPATIENT
Start: 2018-12-15 | End: 2018-12-15

## 2018-12-15 RX ORDER — CIPROFLOXACIN 250 MG/1
250 TABLET, FILM COATED ORAL 2 TIMES DAILY
Qty: 6 TABLET | Refills: 0 | Status: SHIPPED | OUTPATIENT
Start: 2018-12-15 | End: 2019-05-23

## 2018-12-15 RX ADMIN — CIPROFLOXACIN 500 MG: 500 TABLET, FILM COATED ORAL at 16:05

## 2018-12-15 ASSESSMENT — PAIN SCALES - GENERAL: PAINLEVEL: MILD PAIN (3)

## 2018-12-15 NOTE — NURSING NOTE
Patient presents with possible UTI, cramps and pain with voiding for 1 day. Pt has done no interventions.  Karen Rdz LPN....................  12/15/2018   3:02 PM

## 2018-12-15 NOTE — PROGRESS NOTES
Nursing Notes:   Karen Rdz LPN  12/15/2018  3:28 PM  Signed  Patient presents with possible UTI, cramps and pain with voiding for 1 day. Pt has done no interventions.  Karen Rdz LPN....................  12/15/2018   3:02 PM         SUBJECTIVE:   Shaista Esqueda is a 28 year old female who presents to clinic today for the following health issues:    URINARY TRACT SYMPTOMS      Duration: 2 days    Description  dysuria, frequency and urgency    Intensity:  moderate    Accompanying signs and symptoms:  Fever/chills: no   Flank pain no   Nausea and vomiting: no   Vaginal symptoms: none  Abdominal/Pelvic Pain: no     History  History of frequent UTI's: no   History of kidney stones: no   Sexually Active: YES  Possibility of pregnancy: No    Precipitating or alleviating factors: None    Therapies tried and outcome: increase fluid intake       Problem list and histories reviewed & adjusted, as indicated.  Additional history: as documented    Current Outpatient Medications   Medication Sig Dispense Refill     ciprofloxacin (CIPRO) 250 MG tablet Take 1 tablet (250 mg) by mouth 2 times daily for 3 days 6 tablet 0     calcium carbonate (TUMS) 500 MG chewable tablet Take 1 chew tab by mouth 2 times daily       omeprazole (PRILOSEC) 20 MG CR capsule Take 1 capsule (20 mg) by mouth 2 times daily 60 capsule 2     Simethicone 125 MG CAPS Take 1 tablet by mouth 4 times daily (before meals and nightly) 60 capsule 0     Allergies   Allergen Reactions     Amoxicillin Hives     Cefaclor Unknown     Sulfamethoxazole-Trimethoprim Unknown         ROS:  Notable findings in the HPI.       OBJECTIVE:     /90 (BP Location: Right arm, Patient Position: Sitting, Cuff Size: Adult Regular)   Pulse 115   Temp 98.1  F (36.7  C) (Tympanic)   Wt 98.2 kg (216 lb 6.4 oz)   SpO2 98%   BMI 36.01 kg/m    Body mass index is 36.01 kg/m .  GENERAL: healthy, alert and no distress  EYES: Eyes grossly normal to inspection  HENT: normal  cephalic/atraumatic and oral mucous membranes moist  RESP: without increased work of breathing.   ABDOMEN: tenderness suprapubic, no organomegaly or masses, liver span normal to percussion and bowel sounds normal  SKIN: no suspicious lesions or rashes  BACK: no CVA tenderness, no paralumbar tenderness    Diagnostic Test Results:  Results for orders placed or performed in visit on 12/15/18 (from the past 24 hour(s))   Urinalysis w Reflex Microscopic If Positive   Result Value Ref Range    Color Urine Yellow     Appearance Urine Clear     Glucose Urine Negative NEG^Negative mg/dL    Bilirubin Urine Negative NEG^Negative    Ketones Urine Negative NEG^Negative mg/dL    Specific Gravity Urine 1.010 1.000 - 1.030    Blood Urine Moderate (A) NEG^Negative    pH Urine 6.0 5.0 - 9.0 pH    Protein Albumin Urine Negative NEG^Negative mg/dL    Urobilinogen Urine 0.2 0.2 - 1.0 EU/dL    Nitrite Urine Negative NEG^Negative    Leukocyte Esterase Urine Small (A) NEG^Negative    Source Midstream Urine    Urine Microscopic   Result Value Ref Range    WBC Urine 5-10 (A) OTO5^0 - 5 /HPF    RBC Urine 2-5 (A) OTO2^O - 2 /HPF    Bacteria Urine Few (A) NEG^Negative /HPF       ASSESSMENT/PLAN:     1. Urinary urgency  - Urinalysis w Reflex Microscopic If Positive  - Urine Microscopic    2. Acute cystitis without hematuria  - ciprofloxacin (CIPRO) tablet 500 mg; Take 1 tablet (500 mg) by mouth once  - ciprofloxacin (CIPRO) 250 MG tablet; Take 1 tablet (250 mg) by mouth 2 times daily for 3 days  Dispense: 6 tablet; Refill: 0        PLAN:    UTI Adult:  SEPTRA (SULFA) ALLERGY:  Ciprofloxacin 250mg twice daily for 3 days  - Ensure you are drinking plenty of fluids, emptying your bladder when you feel the urge versus holding urine, after urinating you can bear-down to empty bladder completely, after peeing wipe front to back to avoid introducing bacteria towards vaginal area.   - Call or return to clinic prn if these symptoms worsen or fail to  improve as anticipated.    Followup:    If not improving or if condition worsens, follow up with your Primary Care Provider    I explained my diagnostic considerations and recommendations to the patient, who voiced understanding and agreement with the treatment plan. All questions were answered. We discussed potential side effects of any prescribed or recommended therapies, as well as expectations for response to treatments. She was advised to contact our office if there is no improvement or worsening of conditions or symptoms.  If s/s worsen or persist, patient will either come back or follow up with PCP.    Disclaimer:  This note consists of words and symbols derived from keyboarding, dictation, or using voice recognition software. As a result, there may be errors in the script that have gone undetected. Please consider this when interpreting information found in this note.      Terese Juarez NP, 12/15/2018 3:42 PM

## 2018-12-17 LAB
BACTERIA SPEC CULT: ABNORMAL
SPECIMEN SOURCE: ABNORMAL

## 2019-05-23 ENCOUNTER — OFFICE VISIT (OUTPATIENT)
Dept: FAMILY MEDICINE | Facility: OTHER | Age: 29
End: 2019-05-23
Attending: NURSE PRACTITIONER
Payer: COMMERCIAL

## 2019-05-23 VITALS
HEIGHT: 65 IN | RESPIRATION RATE: 16 BRPM | TEMPERATURE: 98.4 F | HEART RATE: 88 BPM | WEIGHT: 232 LBS | DIASTOLIC BLOOD PRESSURE: 78 MMHG | BODY MASS INDEX: 38.65 KG/M2 | SYSTOLIC BLOOD PRESSURE: 122 MMHG

## 2019-05-23 DIAGNOSIS — Z00.00 ENCOUNTER FOR PREVENTIVE CARE: Primary | ICD-10-CM

## 2019-05-23 PROCEDURE — 99395 PREV VISIT EST AGE 18-39: CPT | Performed by: NURSE PRACTITIONER

## 2019-05-23 ASSESSMENT — MIFFLIN-ST. JEOR: SCORE: 1773.23

## 2019-05-23 ASSESSMENT — PAIN SCALES - GENERAL: PAINLEVEL: NO PAIN (0)

## 2019-05-23 ASSESSMENT — PATIENT HEALTH QUESTIONNAIRE - PHQ9: SUM OF ALL RESPONSES TO PHQ QUESTIONS 1-9: 0

## 2019-05-23 NOTE — PROGRESS NOTES
Nursing Notes:   Cole Wilson LPN  5/23/2019 11:25 AM  Signed  Patient presents to clinic today for an annual physical. Patient stated she has the Merana and has had no problems since getting it; however, a month ago she had excruciating pains in the lower abdominal area.     No LMP recorded. Patient has had an implant.  Medication Reconciliation: complete    Cole Wilson LPN  5/23/2019 11:02 AM    Nursing note reviewed with patient.  Accurracy and completeness verified.   Ms. Esqueda is a 28 year old female who:  Patient presents with:  Physical      ICD-10-CM    1. Encounter for preventive care Z00.00      HPI     Presents for yearly preventive review--- had IUD placed Mirena 2018 has not had any issues except a few weeks ago she was laying in bed and noticed some severe pelvic cramping felt like she was going to have her menstrual cycle lasted for about 1/2-hour, there was no bleeding or spotting and resolved and does not happen since  Otherwise she likes the IUD and this works well for contraception    She is due for Tdap  PAP 2018--neg cytology and HPV screening--no previous abnormal      Continues to smoke tobacco trying to get herself mentally prepared to set a quit date--- she may consider gum  We discussed Chantix--- she will consider and let me know if she would like to try this  She would like to quit by January 2020    Has no other concerns  Works in dietary at [employer]    Review of Systems   All other systems reviewed and are negative.     All other systems reviewed and negative.     GAIL:   No flowsheet data found.  PHQ9:  PHQ-9 SCORE 5/23/2019   PHQ-9 Total Score 0       I have personally reviewed the past medical history, past surgical history, medications, allergies, family and social history as listed below, on 5/23/2019.    Allergies   Allergen Reactions     Amoxicillin Hives     Cefaclor Unknown     Sulfamethoxazole-Trimethoprim Unknown       Current Outpatient Medications   Medication Sig  Dispense Refill     calcium carbonate (TUMS) 500 MG chewable tablet Take 1 chew tab by mouth 2 times daily       omeprazole (PRILOSEC) 20 MG DR capsule Take 1 capsule (20 mg) by mouth 2 times daily 180 capsule 3        Patient Active Problem List    Diagnosis Date Noted     Family history of malignant neoplasm of breast 01/30/2018     Priority: Medium     Family history of ischemic heart disease 01/30/2018     Priority: Medium     Contraceptive management 08/08/2012     Priority: Medium     Dysmenorrhea 08/22/2011     Priority: Medium     Encounter for sterilization 08/22/2011     Priority: Medium     Surveillance of previously prescribed contraceptive pill 05/25/2010     Priority: Medium     Temporomandibular joint disorder 05/25/2010     Priority: Medium     History reviewed. No pertinent past medical history.  Past Surgical History:   Procedure Laterality Date     OTHER SURGICAL HISTORY      02456.0,PAST SURGICAL HISTORY,None     Social History     Socioeconomic History     Marital status: Single     Spouse name: None     Number of children: None     Years of education: None     Highest education level: None   Occupational History     None   Social Needs     Financial resource strain: None     Food insecurity:     Worry: None     Inability: None     Transportation needs:     Medical: None     Non-medical: None   Tobacco Use     Smoking status: Current Every Day Smoker     Packs/day: 0.25     Years: 8.00     Pack years: 2.00     Types: Cigarettes     Smokeless tobacco: Never Used   Substance and Sexual Activity     Alcohol use: Yes     Comment: Alcoholic Drinks/day: Socially     Drug use: Never     Comment: Drug use: No     Sexual activity: Yes     Partners: Male     Birth control/protection: IUD   Lifestyle     Physical activity:     Days per week: None     Minutes per session: None     Stress: None   Relationships     Social connections:     Talks on phone: None     Gets together: None     Attends Gnosticism  "service: None     Active member of club or organization: None     Attends meetings of clubs or organizations: None     Relationship status: None     Intimate partner violence:     Fear of current or ex partner: None     Emotionally abused: None     Physically abused: None     Forced sexual activity: None   Other Topics Concern     None   Social History Narrative    Graduated from [employer] in [place].     Works at [employer].     Single    Preload 7/23/2013     Family History   Problem Relation Age of Onset     Family History Negative Mother         Good Health     Thyroid Disease Mother         Thyroid Disease     Breast Cancer Maternal Aunt 60        Cancer-breast     Hypertension Father      Heart Disease Maternal Grandfather         Heart Disease,Family History Coronary Heart Disease male < 55 - m uncle, mgf     Breast Cancer Maternal Aunt 60        Cancer-breast     Prostate Cancer Maternal Uncle         Cancer-prostate     Thyroid Disease Maternal Uncle         Thyroid Disease     Seizure Disorder Maternal Aunt         Seizures     Heart Disease Paternal Uncle 54        Heart Disease,MI     Ovarian Cancer No family hx of         Cancer-ovarian     Anesthesia Reaction No family hx of         Anesthesia Problem     Blood Disease No family hx of         Blood Disease     Other - See Comments No family hx of         Stroke     Colon Cancer No family hx of         Cancer-colon     Hyperlipidemia No family hx of         Hyperlipidemia       EXAM:   Vitals:    05/23/19 1106   BP: 122/78   BP Location: Right arm   Patient Position: Sitting   Cuff Size: Adult Regular   Pulse: 88   Resp: 16   Temp: 98.4  F (36.9  C)   TempSrc: Temporal   Weight: 105.2 kg (232 lb)   Height: 1.651 m (5' 5\")       Current Pain Score: No Pain (0)     BP Readings from Last 3 Encounters:   05/23/19 122/78   12/15/18 128/90   08/03/18 130/90      Wt Readings from Last 3 Encounters:   05/23/19 105.2 kg (232 lb) " "  12/15/18 98.2 kg (216 lb 6.4 oz)   08/03/18 93.8 kg (206 lb 12.8 oz)      Estimated body mass index is 38.61 kg/m  as calculated from the following:    Height as of this encounter: 1.651 m (5' 5\").    Weight as of this encounter: 105.2 kg (232 lb).     Physical Exam   Constitutional: She is oriented to person, place, and time. She appears well-developed and well-nourished.   Cardiovascular: Normal rate.   Pulmonary/Chest: Effort normal.   Musculoskeletal: Normal range of motion.   Neurological: She is alert and oriented to person, place, and time.   Skin: Skin is warm and dry.   Psychiatric: She has a normal mood and affect. Her behavior is normal. Judgment and thought content normal.   Nursing note and vitals reviewed.       INVESTIGATIONS:  Results for orders placed or performed in visit on 12/15/18   Urinalysis w Reflex Microscopic If Positive   Result Value Ref Range    Color Urine Yellow     Appearance Urine Clear     Glucose Urine Negative NEG^Negative mg/dL    Bilirubin Urine Negative NEG^Negative    Ketones Urine Negative NEG^Negative mg/dL    Specific Gravity Urine 1.010 1.000 - 1.030    Blood Urine Moderate (A) NEG^Negative    pH Urine 6.0 5.0 - 9.0 pH    Protein Albumin Urine Negative NEG^Negative mg/dL    Urobilinogen Urine 0.2 0.2 - 1.0 EU/dL    Nitrite Urine Negative NEG^Negative    Leukocyte Esterase Urine Small (A) NEG^Negative    Source Midstream Urine    Urine Microscopic   Result Value Ref Range    WBC Urine 5-10 (A) OTO5^0 - 5 /HPF    RBC Urine 2-5 (A) OTO2^O - 2 /HPF    Bacteria Urine Few (A) NEG^Negative /HPF   Urine Culture Aerobic Bacterial   Result Value Ref Range    Specimen Description Midstream Urine     Culture Micro >100,000 colonies/mL  Escherichia coli   (A)        Susceptibility    Escherichia coli - RANDI     AMPICILLIN <=8 Sensitive ug/mL     AZTREONAM <=8 Sensitive ug/mL     CEFEPIME <=8 Sensitive ug/mL     CEFTRIAXONE <=8 Sensitive ug/mL     CEFTAZIDIME <=1 Sensitive ug/mL     " CEFOTAXIME <=2 Sensitive ug/mL     CIPROFLOXACIN <=1 Sensitive ug/mL     CEFUROXIME <=4 Sensitive ug/mL     NITROFURANTOIN <=32 Sensitive ug/mL     GENTAMICIN <=4 Sensitive ug/mL     IMIPENEM <=1 Sensitive ug/mL     LEVOFLOXACIN <=2 Sensitive ug/mL     Piperacillin/Tazo <=16 Sensitive ug/mL     Trimethoprim/Sulfa <=2/38 Sensitive ug/mL     TETRACYCLINE <=4 Sensitive ug/mL       ASSESSMENT AND PLAN:  Problem List Items Addressed This Visit     None      Visit Diagnoses     Encounter for preventive care    -  Primary        We discussed tobacco cessation education, treatments and followup planning--she will let me know if she wants to try chantix and I'll send to [facility] pharmacy  She may try gum OTC and see if she can get through the day without tobacco--was educated on not using tobacco and supplements together    Directed to followup with GYN if any further issues with Mirena--she is certain of no expulsion--discussed could check placement with US--declines at this time    Reviewed meds and history--updated family history    followup for yearly preventive          -- Expected clinical course discussed    -- Medications and their side effects discussed    There are no Patient Instructions on file for this visit.  Venita Montgomery CNP  [facility]     Portions of this note were dictated using speech recognition software. The note has been proofread but errors in the text may have been overlooked. Please contact me if there are any concerns regarding the accuracy of the dictation.

## 2019-05-23 NOTE — NURSING NOTE
Patient presents to clinic today for an annual physical. Patient stated she has the Merana and has had no problems since getting it; however, a month ago she had excruciating pains in the lower abdominal area.     No LMP recorded. Patient has had an implant.  Medication Reconciliation: dana Wilson LPN  5/23/2019 11:02 AM

## 2019-11-05 DIAGNOSIS — Z71.6 ENCOUNTER FOR TOBACCO USE CESSATION COUNSELING: Primary | ICD-10-CM

## 2019-11-05 RX ORDER — VARENICLINE TARTRATE 1 MG/1
1 TABLET, FILM COATED ORAL 2 TIMES DAILY
Qty: 60 TABLET | Refills: 3 | Status: SHIPPED | OUTPATIENT
Start: 2019-11-05 | End: 2023-05-30

## 2020-09-21 ENCOUNTER — OFFICE VISIT (OUTPATIENT)
Dept: FAMILY MEDICINE | Facility: OTHER | Age: 30
End: 2020-09-21
Attending: NURSE PRACTITIONER
Payer: COMMERCIAL

## 2020-09-21 VITALS
WEIGHT: 228.6 LBS | DIASTOLIC BLOOD PRESSURE: 86 MMHG | RESPIRATION RATE: 17 BRPM | SYSTOLIC BLOOD PRESSURE: 114 MMHG | TEMPERATURE: 97.1 F | BODY MASS INDEX: 38.09 KG/M2 | HEART RATE: 78 BPM | HEIGHT: 65 IN | OXYGEN SATURATION: 97 %

## 2020-09-21 DIAGNOSIS — R39.89 SUSPECTED UTI: Primary | ICD-10-CM

## 2020-09-21 LAB
ALBUMIN UR-MCNC: NEGATIVE MG/DL
APPEARANCE UR: CLEAR
BILIRUB UR QL STRIP: NEGATIVE
COLOR UR AUTO: NORMAL
GLUCOSE UR STRIP-MCNC: NEGATIVE MG/DL
HGB UR QL STRIP: NEGATIVE
KETONES UR STRIP-MCNC: NEGATIVE MG/DL
LEUKOCYTE ESTERASE UR QL STRIP: NEGATIVE
NITRATE UR QL: NEGATIVE
PH UR STRIP: 6 PH (ref 5–7)
SOURCE: NORMAL
SP GR UR STRIP: 1.02 (ref 1–1.03)
UROBILINOGEN UR STRIP-MCNC: NORMAL MG/DL (ref 0–2)

## 2020-09-21 PROCEDURE — 99214 OFFICE O/P EST MOD 30 MIN: CPT | Performed by: NURSE PRACTITIONER

## 2020-09-21 PROCEDURE — 81003 URINALYSIS AUTO W/O SCOPE: CPT | Mod: ZL | Performed by: NURSE PRACTITIONER

## 2020-09-21 PROCEDURE — 87086 URINE CULTURE/COLONY COUNT: CPT | Mod: ZL | Performed by: NURSE PRACTITIONER

## 2020-09-21 RX ORDER — NITROFURANTOIN 25; 75 MG/1; MG/1
100 CAPSULE ORAL 2 TIMES DAILY
Qty: 10 CAPSULE | Refills: 0 | Status: SHIPPED | OUTPATIENT
Start: 2020-09-21 | End: 2020-09-26

## 2020-09-21 SDOH — HEALTH STABILITY: MENTAL HEALTH: HOW OFTEN DO YOU HAVE A DRINK CONTAINING ALCOHOL?: 4 OR MORE TIMES A WEEK

## 2020-09-21 ASSESSMENT — MIFFLIN-ST. JEOR: SCORE: 1747.8

## 2020-09-21 ASSESSMENT — PAIN SCALES - GENERAL: PAINLEVEL: MILD PAIN (2)

## 2020-09-21 NOTE — NURSING NOTE
"Chief Complaint   Patient presents with     UTI     Patient presents to clinic with abdominal pain and urinary frequency that has been ongoing for the last 2 weeks.     Initial /86 (BP Location: Right arm, Patient Position: Sitting, Cuff Size: Adult Regular)   Pulse 78   Temp 97.1  F (36.2  C) (Tympanic)   Resp 17   Ht 1.651 m (5' 5\")   Wt 103.7 kg (228 lb 9.6 oz)   SpO2 97%   Breastfeeding No   BMI 38.04 kg/m   Estimated body mass index is 38.04 kg/m  as calculated from the following:    Height as of this encounter: 1.651 m (5' 5\").    Weight as of this encounter: 103.7 kg (228 lb 9.6 oz).    Medication Reconciliation: complete      Davida Burden  "

## 2020-09-21 NOTE — PROGRESS NOTES
HPI:    Shaista Esqueda is a 30 year old female  who presents to [facility] today for UTI concerns.    States symptoms for the past 2 weeks worsening the past few days.  Symptoms include generalized suprapubic pressure and cramping, urinary frequency, minimal dysuria.  No fevers or chills.    No blood noted in urine.  No nausea or vomiting.  No diarrhea or constipation.  No mid or upper abdominal pain.  Mild lower back pain but not much to be bothersome.  LMP - states she no longer gets her menstrual cycles since getting a Mirena implant.  No vaginal itching, discharge, pain or bleeding.  No STD concerns.    States she has had a few UTIs in the past.  No hx of kidney stones.        No past medical history on file.  Past Surgical History:   Procedure Laterality Date     OTHER SURGICAL HISTORY      86845.0,PAST SURGICAL HISTORY,None     Social History     Tobacco Use     Smoking status: Current Every Day Smoker     Packs/day: 0.25     Years: 8.00     Pack years: 2.00     Types: Cigarettes     Smokeless tobacco: Never Used   Substance Use Topics     Alcohol use: Yes     Frequency: 4 or more times a week     Comment: Alcoholic Drinks/day: Socially     Current Outpatient Medications   Medication Sig Dispense Refill     calcium carbonate (TUMS) 500 MG chewable tablet Take 1 chew tab by mouth 2 times daily       omeprazole (PRILOSEC) 20 MG DR capsule Take 1 capsule (20 mg) by mouth 2 times daily 180 capsule 3     varenicline (CHANTIX ZOILA) 0.5 MG X 11 & 1 MG X 42 tablet Take 0.5 mg tab daily for 3 days, THEN 0.5 mg tab twice daily for 4 days, THEN 1 mg twice daily. (Patient not taking: Reported on 9/21/2020) 53 tablet 0     varenicline (CHANTIX) 1 MG tablet Take 1 tablet (1 mg) by mouth 2 times daily (Patient not taking: Reported on 9/21/2020) 60 tablet 3     Allergies   Allergen Reactions     Amoxicillin Hives     Cefaclor Unknown     Sulfamethoxazole-Trimethoprim Unknown         Past medical history, past surgical  "history, current medications and allergies reviewed and accurate to the best of my knowledge.        ROS:  Refer to HPI    /86 (BP Location: Right arm, Patient Position: Sitting, Cuff Size: Adult Regular)   Pulse 78   Temp 97.1  F (36.2  C) (Tympanic)   Resp 17   Ht 1.651 m (5' 5\")   Wt 103.7 kg (228 lb 9.6 oz)   SpO2 97%   Breastfeeding No   BMI 38.04 kg/m      EXAM:  General Appearance: Well appearing adult female, non ill appearance, appropriate appearance for age. No acute distress  Ears: hearing intact to normal voice tones  Eyes: conjunctivae normal without erythema or irritation, corneas clear, no drainage or crusting, no eyelid swelling, pupils equal   Orophayrnx: voice clear.    Respiratory: normal chest wall and respirations.  Normal effort.  Clear to auscultation bilaterally, no wheezing, crackles or rhonchi.  No increased work of breathing.  No cough appreciated.  Cardiac: RRR with no murmurs  Abdomen: soft, nontender, no rigidity, no rebound tenderness or guarding, normal bowel sounds present  :  Mild left suprapubic tenderness to palpation.  No right suprapubic tenderness to palpation.  No CVA/flank  tenderness to palpation.    Musculoskeletal:  Equal movement of bilateral upper extremities.  Equal movement of bilateral lower extremities.  Normal gait.    Dermatological: no rashes noted of exposed skin  Psychological: normal affect, alert, oriented, and pleasant.       Labs:  Results for orders placed or performed in visit on 09/21/20   UA reflex to Microscopic and Culture     Status: None    Specimen: Midstream Urine   Result Value Ref Range    Color Urine Light Yellow     Appearance Urine Clear     Glucose Urine Negative NEG^Negative mg/dL    Bilirubin Urine Negative NEG^Negative    Ketones Urine Negative NEG^Negative mg/dL    Specific Gravity Urine 1.018 1.003 - 1.035    Blood Urine Negative NEG^Negative    pH Urine 6.0 5.0 - 7.0 pH    Protein Albumin Urine Negative NEG^Negative " mg/dL    Urobilinogen mg/dL Normal 0.0 - 2.0 mg/dL    Nitrite Urine Negative NEG^Negative    Leukocyte Esterase Urine Negative NEG^Negative    Source Midstream Urine                ASSESSMENT/PLAN:  1. Suspected UTI    - UA reflex to Microscopic and Culture  - Urine Culture Aerobic Bacterial    - nitroFURantoin macrocrystal-monohydrate (MACROBID) 100 MG capsule; Take 1 capsule (100 mg) by mouth 2 times daily for 5 days  Dispense: 10 capsule; Refill: 0    Urinalysis - normal    Urine culture pending  Will call if culture warrants change of abx.     May use Pyridium OTC PRN.     Encouraged fluids and frequent bladder emptying.    Discussed warning signs/symptoms indicative of need to f/u    Follow up if symptoms persist or worsen or concerns      I explained my diagnostic considerations and recommendations to the patient, who voiced understanding and agreement with the treatment plan. All questions were answered. We discussed potential side effects of any prescribed or recommended therapies, as well as expectations for response to treatments.    Disclaimer:  This note consists of words and symbols derived from keyboarding, dictation, or using voice recognition software. As a result, there may be errors in the script that have gone undetected. Please consider this when interpreting information found in this note.

## 2020-09-24 LAB
BACTERIA SPEC CULT: NORMAL
SPECIMEN SOURCE: NORMAL

## 2020-10-22 ENCOUNTER — HOSPITAL ENCOUNTER (OUTPATIENT)
Dept: CT IMAGING | Facility: OTHER | Age: 30
End: 2020-10-22
Attending: PHYSICIAN ASSISTANT
Payer: COMMERCIAL

## 2020-10-22 ENCOUNTER — HOSPITAL ENCOUNTER (OUTPATIENT)
Dept: GENERAL RADIOLOGY | Facility: OTHER | Age: 30
End: 2020-10-22
Attending: PHYSICIAN ASSISTANT
Payer: COMMERCIAL

## 2020-10-22 ENCOUNTER — OFFICE VISIT (OUTPATIENT)
Dept: FAMILY MEDICINE | Facility: OTHER | Age: 30
End: 2020-10-22
Attending: PHYSICIAN ASSISTANT
Payer: COMMERCIAL

## 2020-10-22 VITALS
WEIGHT: 225 LBS | BODY MASS INDEX: 37.44 KG/M2 | HEART RATE: 100 BPM | RESPIRATION RATE: 20 BRPM | TEMPERATURE: 98 F | OXYGEN SATURATION: 97 % | SYSTOLIC BLOOD PRESSURE: 128 MMHG | DIASTOLIC BLOOD PRESSURE: 80 MMHG

## 2020-10-22 DIAGNOSIS — R10.32 LLQ ABDOMINAL PAIN: ICD-10-CM

## 2020-10-22 DIAGNOSIS — R10.32 LLQ ABDOMINAL PAIN: Primary | ICD-10-CM

## 2020-10-22 LAB
ALBUMIN UR-MCNC: NEGATIVE MG/DL
APPEARANCE UR: CLEAR
BILIRUB UR QL STRIP: NEGATIVE
COLOR UR AUTO: NORMAL
GLUCOSE UR STRIP-MCNC: NEGATIVE MG/DL
HCG UR QL: NEGATIVE
HGB UR QL STRIP: NEGATIVE
KETONES UR STRIP-MCNC: NEGATIVE MG/DL
LEUKOCYTE ESTERASE UR QL STRIP: NEGATIVE
NITRATE UR QL: NEGATIVE
PH UR STRIP: 7 PH (ref 5–7)
SOURCE: NORMAL
SP GR UR STRIP: 1.01 (ref 1–1.03)
SPECIMEN SOURCE: NORMAL
UROBILINOGEN UR STRIP-MCNC: NORMAL MG/DL (ref 0–2)
WET PREP SPEC: NORMAL

## 2020-10-22 PROCEDURE — 74177 CT ABD & PELVIS W/CONTRAST: CPT

## 2020-10-22 PROCEDURE — 81025 URINE PREGNANCY TEST: CPT | Mod: ZL | Performed by: PHYSICIAN ASSISTANT

## 2020-10-22 PROCEDURE — 87210 SMEAR WET MOUNT SALINE/INK: CPT | Mod: ZL | Performed by: PHYSICIAN ASSISTANT

## 2020-10-22 PROCEDURE — 99214 OFFICE O/P EST MOD 30 MIN: CPT | Performed by: PHYSICIAN ASSISTANT

## 2020-10-22 PROCEDURE — 74019 RADEX ABDOMEN 2 VIEWS: CPT

## 2020-10-22 PROCEDURE — 255N000002 HC RX 255 OP 636: Performed by: PHYSICIAN ASSISTANT

## 2020-10-22 PROCEDURE — 81003 URINALYSIS AUTO W/O SCOPE: CPT | Mod: ZL | Performed by: PHYSICIAN ASSISTANT

## 2020-10-22 RX ADMIN — IOHEXOL 100 ML: 350 INJECTION, SOLUTION INTRAVENOUS at 17:54

## 2020-10-22 ASSESSMENT — PAIN SCALES - GENERAL: PAINLEVEL: WORST PAIN (10)

## 2020-10-22 ASSESSMENT — MIFFLIN-ST. JEOR: SCORE: 1731.47

## 2020-10-22 NOTE — NURSING NOTE
"Chief Complaint   Patient presents with     Abdominal Pain     with diarrhea      Patient has had abdominal pain that rates a #10 at times with a constant #1 pain for 1 month. She has had constipation and diarrhea .   Initial /80 (BP Location: Right arm, Patient Position: Sitting, Cuff Size: Adult Large)   Pulse 100   Temp 98  F (36.7  C) (Temporal)   Resp 20   Ht (P) 1.651 m (5' 5\")   Wt 102.1 kg (225 lb)   SpO2 97%   BMI (P) 37.44 kg/m   Estimated body mass index is 37.44 kg/m  (pended) as calculated from the following:    Height as of this encounter: (P) 1.651 m (5' 5\").    Weight as of this encounter: 102.1 kg (225 lb).  Medication Reconciliation: complete    Indiana Liu LPN  "

## 2020-11-17 NOTE — PROGRESS NOTES
SUBJECTIVE:     HPI  Shaista Esqueda is a 30 year old female who presents to clinic today for evaluation of abdominal concerns. Seen in clinic on 10/22/2020 for LLQ abdominal pain that had been going on for several weeks. UA, urine pregnancy, and wet prep were unremarkable at that time. Unremarkable abdominal x-ray. CT abdomen/pelvis unremarkable other than some evidence of recently ruptured follicle in right ovary. No additional work up or treatment at that time.     She is presenting today with continued persistent LLQ abdominal/left sided pelvic pain that is now radiating to her left lower back. The pain is usually around a 1-2/10 but occasionally spikes to 4-5/10 on pain severity scale. Describes it as a cramping type pain. Does have IUD that was inserted in 2018. Last pap and HPV negative in 01/2018. Diarrhea has since resolved. Normal bowel movements once daily. Has not been taking anything for symptomatic relief. Denies associated vaginal discharge, burning, or itching, nausea, vomiting, diarrhea, constipation, urinary symptoms, blood in stool or urine. No pregnancy or STI concerns.       Review of Systems   Constitutional: Negative for activity change, appetite change, chills, fatigue and fever.   HENT: Negative.    Respiratory: Negative.    Cardiovascular: Negative.    Gastrointestinal: Positive for abdominal pain. Negative for constipation, diarrhea, hematochezia, nausea and vomiting.   Genitourinary: Positive for pelvic pain. Negative for dysuria, flank pain, frequency, hematuria, menstrual problem, urgency, vaginal discharge and vaginal pain.   Skin: Negative.           PAST MEDICAL HISTORY: No past medical history on file.    PAST SURGICAL HISTORY:   Past Surgical History:   Procedure Laterality Date     OTHER SURGICAL HISTORY      15937.0,PAST SURGICAL HISTORY,None       FAMILY HISTORY:   Family History   Problem Relation Age of Onset     Family History Negative Mother         Good Health      "Thyroid Disease Mother         Thyroid Disease     Breast Cancer Maternal Aunt 60        Cancer-breast     Hypertension Father      Heart Disease Maternal Grandfather         Heart Disease,Family History Coronary Heart Disease male < 55 - m uncle, mgf     Breast Cancer Maternal Aunt 60        Cancer-breast     Prostate Cancer Maternal Uncle         Cancer-prostate     Thyroid Disease Maternal Uncle         Thyroid Disease     Seizure Disorder Maternal Aunt         Seizures     Heart Disease Paternal Uncle 54        Heart Disease,MI     Ovarian Cancer No family hx of         Cancer-ovarian     Anesthesia Reaction No family hx of         Anesthesia Problem     Blood Disease No family hx of         Blood Disease     Other - See Comments No family hx of         Stroke     Colon Cancer No family hx of         Cancer-colon     Hyperlipidemia No family hx of         Hyperlipidemia       SOCIAL HISTORY:   Social History     Tobacco Use     Smoking status: Current Every Day Smoker     Packs/day: 0.25     Years: 8.00     Pack years: 2.00     Types: Cigarettes     Smokeless tobacco: Never Used   Substance Use Topics     Alcohol use: Yes     Frequency: 4 or more times a week     Comment: Alcoholic Drinks/day: Socially        Allergies   Allergen Reactions     Amoxicillin Hives     Cefaclor Unknown     Sulfamethoxazole-Trimethoprim Unknown     Current Outpatient Medications   Medication     calcium carbonate (TUMS) 500 MG chewable tablet     omeprazole (PRILOSEC) 20 MG DR capsule     varenicline (CHANTIX ZOILA) 0.5 MG X 11 & 1 MG X 42 tablet     varenicline (CHANTIX) 1 MG tablet     No current facility-administered medications for this visit.        OBJECTIVE:     BP (P) 116/72   Pulse (P) 105   Temp (P) 98.1  F (36.7  C)   Resp (P) 12   Ht (P) 1.651 m (5' 5\")   Wt (P) 102.9 kg (226 lb 12.8 oz)   SpO2 (P) 99%   Breastfeeding No   BMI (P) 37.74 kg/m    Body mass index is 37.74 kg/m  (pended).  Physical Exam  General: " Pleasant, in no apparent distress.  Eyes: Sclera are white and conjunctiva are clear bilaterally. Lacrimal apparatus free of erythema, edema, and discharge bilaterally.  Ears: External ears without erythema or edema.   Nose: External nose is symmetrical and free of lesions and deformities.   Cardiovascular: Regular rate and rhythm with S1 equal to S2. No murmurs, friction rubs, or gallops.   Respiratory: Lungs are resonant and clear to auscultation bilaterally. No wheezes, crackles, or rhonchi.  Abdomen: Abdomen is non-distended and without bulging flanks, prominent venous markings, or ecchymosis. Active bowel sounds heard in all four quadrants. No tenderness to palpation in all four quadrants. No rebound tenderness or guarding. No palpable masses noted. No hepatosplenomegaly. No CVA tenderness.   Genitourinary Exam Female: External genitalia, vulva and vagina are without lesions, masses, or ulcerations. Bimanual exam reveals normal uterus and adnexa with no masses, nontender urethra and bladder. No cervical motion tenderness.   Psych: Appropriate mood and affect.    Results for orders placed or performed in visit on 11/19/20   CBC W PLT No Diff     Status: Abnormal   Result Value Ref Range    WBC 8.3 4.0 - 11.0 10e9/L    RBC Count 5.63 (H) 3.8 - 5.2 10e12/L    Hemoglobin 15.2 11.7 - 15.7 g/dL    Hematocrit 47.2 (H) 35.0 - 47.0 %    MCV 84 78 - 100 fl    MCH 27.0 26.5 - 33.0 pg    MCHC 32.2 31.5 - 36.5 g/dL    RDW 13.5 10.0 - 15.0 %    Platelet Count 290 150 - 450 10e9/L         ASSESSMENT/PLAN:     1. Pelvic pain in female    2. LLQ abdominal pain      Given persistent LLQ/left sided pelvic pain for nearly 2 months, completed bimanual exam and abdominal exam which were unremarkable as outlined above. Additional workup today included CBC, CMP. Lab results pending, will notify patient with results. Consideration for possible gc/chlamydia testing despite no concerns for STI if all else is negative. Given possible  ruptured follicle seen on CT previously but patient is on IUD so likely not ovulating, considering possible ovarian cysts or other  etiology. Ordered pelvic ultrasound for further evaluation. Will notify patient with results. Will make additional treatment plan once all lab and imaging results are received.       Saira Beaver PA-C  [facility]

## 2020-11-19 ENCOUNTER — OFFICE VISIT (OUTPATIENT)
Dept: FAMILY MEDICINE | Facility: OTHER | Age: 30
End: 2020-11-19
Attending: PHYSICIAN ASSISTANT
Payer: COMMERCIAL

## 2020-11-19 DIAGNOSIS — R10.2 PELVIC PAIN IN FEMALE: Primary | ICD-10-CM

## 2020-11-19 DIAGNOSIS — R10.32 LLQ ABDOMINAL PAIN: ICD-10-CM

## 2020-11-19 LAB
ALBUMIN SERPL-MCNC: 4.4 G/DL (ref 3.5–5.7)
ALP SERPL-CCNC: 60 U/L (ref 34–104)
ALT SERPL W P-5'-P-CCNC: 16 U/L (ref 7–52)
ANION GAP SERPL CALCULATED.3IONS-SCNC: 7 MMOL/L (ref 3–14)
AST SERPL W P-5'-P-CCNC: 13 U/L (ref 13–39)
BILIRUB SERPL-MCNC: 0.4 MG/DL (ref 0.3–1)
BUN SERPL-MCNC: 13 MG/DL (ref 7–25)
CALCIUM SERPL-MCNC: 9.4 MG/DL (ref 8.6–10.3)
CHLORIDE SERPL-SCNC: 105 MMOL/L (ref 98–107)
CO2 SERPL-SCNC: 26 MMOL/L (ref 21–31)
CREAT SERPL-MCNC: 0.83 MG/DL (ref 0.6–1.2)
ERYTHROCYTE [DISTWIDTH] IN BLOOD BY AUTOMATED COUNT: 13.5 % (ref 10–15)
GFR SERPL CREATININE-BSD FRML MDRD: 80 ML/MIN/{1.73_M2}
GLUCOSE SERPL-MCNC: 93 MG/DL (ref 70–105)
HCT VFR BLD AUTO: 47.2 % (ref 35–47)
HGB BLD-MCNC: 15.2 G/DL (ref 11.7–15.7)
MCH RBC QN AUTO: 27 PG (ref 26.5–33)
MCHC RBC AUTO-ENTMCNC: 32.2 G/DL (ref 31.5–36.5)
MCV RBC AUTO: 84 FL (ref 78–100)
PLATELET # BLD AUTO: 290 10E9/L (ref 150–450)
POTASSIUM SERPL-SCNC: 4.2 MMOL/L (ref 3.5–5.1)
PROT SERPL-MCNC: 7.1 G/DL (ref 6.4–8.9)
RBC # BLD AUTO: 5.63 10E12/L (ref 3.8–5.2)
SODIUM SERPL-SCNC: 138 MMOL/L (ref 134–144)
WBC # BLD AUTO: 8.3 10E9/L (ref 4–11)

## 2020-11-19 PROCEDURE — 99213 OFFICE O/P EST LOW 20 MIN: CPT | Performed by: PHYSICIAN ASSISTANT

## 2020-11-19 PROCEDURE — 36415 COLL VENOUS BLD VENIPUNCTURE: CPT | Mod: ZL | Performed by: PHYSICIAN ASSISTANT

## 2020-11-19 PROCEDURE — 80053 COMPREHEN METABOLIC PANEL: CPT | Mod: ZL | Performed by: PHYSICIAN ASSISTANT

## 2020-11-19 PROCEDURE — 85027 COMPLETE CBC AUTOMATED: CPT | Mod: ZL | Performed by: PHYSICIAN ASSISTANT

## 2020-11-19 ASSESSMENT — ENCOUNTER SYMPTOMS
APPETITE CHANGE: 0
ACTIVITY CHANGE: 0
VOMITING: 0
FATIGUE: 0
FLANK PAIN: 0
FEVER: 0
FREQUENCY: 0
NAUSEA: 0
ABDOMINAL PAIN: 1
CHILLS: 0
HEMATURIA: 0
CONSTIPATION: 0
RESPIRATORY NEGATIVE: 1
HEMATOCHEZIA: 0
CARDIOVASCULAR NEGATIVE: 1
DIARRHEA: 0
DYSURIA: 0

## 2020-11-19 ASSESSMENT — MIFFLIN-ST. JEOR: SCORE: 1739.64

## 2020-11-19 ASSESSMENT — PAIN SCALES - GENERAL: PAINLEVEL: NO PAIN (1)

## 2020-11-19 NOTE — NURSING NOTE
Patient presents to clinic for follow up of abdominal pain.  Yolanda Albarran LPN ....................  11/19/2020   10:58 AM

## 2020-11-23 ENCOUNTER — HOSPITAL ENCOUNTER (OUTPATIENT)
Dept: ULTRASOUND IMAGING | Facility: OTHER | Age: 30
Discharge: HOME | End: 2020-11-23
Attending: PHYSICIAN ASSISTANT | Admitting: PHYSICIAN ASSISTANT
Payer: COMMERCIAL

## 2020-11-23 DIAGNOSIS — R10.2 PELVIC PAIN IN FEMALE: ICD-10-CM

## 2020-11-23 PROCEDURE — 76856 US EXAM PELVIC COMPLETE: CPT

## 2020-12-04 ENCOUNTER — OFFICE VISIT (OUTPATIENT)
Dept: OBGYN | Facility: OTHER | Age: 30
End: 2020-12-04
Attending: OBSTETRICS & GYNECOLOGY
Payer: COMMERCIAL

## 2020-12-04 VITALS
RESPIRATION RATE: 12 BRPM | DIASTOLIC BLOOD PRESSURE: 96 MMHG | HEART RATE: 88 BPM | WEIGHT: 229.9 LBS | SYSTOLIC BLOOD PRESSURE: 124 MMHG | BODY MASS INDEX: 38.26 KG/M2

## 2020-12-04 DIAGNOSIS — Z11.51 SCREENING FOR HUMAN PAPILLOMAVIRUS: Primary | ICD-10-CM

## 2020-12-04 DIAGNOSIS — G57.02 PIRIFORMIS SYNDROME, LEFT: ICD-10-CM

## 2020-12-04 PROCEDURE — G0123 SCREEN CERV/VAG THIN LAYER: HCPCS | Performed by: OBSTETRICS & GYNECOLOGY

## 2020-12-04 PROCEDURE — 87624 HPV HI-RISK TYP POOLED RSLT: CPT | Mod: ZL | Performed by: OBSTETRICS & GYNECOLOGY

## 2020-12-04 PROCEDURE — 99214 OFFICE O/P EST MOD 30 MIN: CPT | Performed by: OBSTETRICS & GYNECOLOGY

## 2020-12-04 PROCEDURE — 88142 CYTOPATH C/V THIN LAYER: CPT | Performed by: OBSTETRICS & GYNECOLOGY

## 2020-12-04 RX ORDER — IBUPROFEN 600 MG/1
600 TABLET, FILM COATED ORAL EVERY 6 HOURS PRN
Qty: 100 TABLET | Refills: 1 | Status: SHIPPED | OUTPATIENT
Start: 2020-12-04 | End: 2021-08-29

## 2020-12-04 ASSESSMENT — PAIN SCALES - GENERAL: PAINLEVEL: NO PAIN (0)

## 2020-12-07 NOTE — PROGRESS NOTES
Shaista comes in with 3 to 4-month history of left-sided pain.  Sometimes she feels it more anterior but often and wraps around into the sacral area left side of her lower back.  Initially it was diagnosis of possible ruptured cyst then possibly a UTI.    She has had a negative CT scan and x-ray.  Negative pelvic ultrasound.    Has a Mirena IUD in place and is on menorrhea.  The pain is not associated in any cyclic nature.  Worse after she ambulates towards the end of the day.  Does hurt to move her leg in certain directions on the left side.  The pain is not present at night.    Occasionally has some discomfort after urination but otherwise no urinary tract symptomatology.  No change in bowel function.    Last Pap smear was about 3 years ago.  Is requesting another one today    Medical history urgently negative    Allergies amoxicillin cephalosporins, sulfa    Medications reviewed.    Social history, significant other.  Works in food services.    General review of systems GYN, , area systems otherwise negative    Physical exam:  Patient is alert orientated x3  Blood pressure 124/96 weight 229, pulse 88  Abdomen soft benign.  No myofascial tenderness.  No flank pain.  No evidence of herniation.  Pelvic external without lesion vagina and cervix clean.  IUD string present.  Pap obtained.  Careful bimanual exam shows no significant urethral or trigone pain..  No cervical motion tenderness.  No adnexal pain.    Patient does have distinct tenderness along the left pubic coccyx muscle and piriformis.  The obturator internus does not appear to be tender.  No right side tenderness elicited.    Assessment: Pelvic discomfort of 3+ months duration.  Appears to be most consistent with a piriformis type syndrome.  No gynecologic source apparent.  Negative imaging.    The above discussed.  Will place on ibuprofen 600 mg 2-3 times a day.  We will make arrangements for physical therapy.    30 minutes spent.

## 2020-12-08 LAB
COPATH REPORT: NORMAL
PAP: NORMAL

## 2020-12-09 LAB
FINAL DIAGNOSIS: NORMAL
HPV HR 12 DNA CVX QL NAA+PROBE: NEGATIVE
HPV16 DNA SPEC QL NAA+PROBE: NEGATIVE
HPV18 DNA SPEC QL NAA+PROBE: NEGATIVE
SPECIMEN DESCRIPTION: NORMAL
SPECIMEN SOURCE CVX/VAG CYTO: NORMAL

## 2021-01-12 ENCOUNTER — HOSPITAL ENCOUNTER (OUTPATIENT)
Dept: PHYSICAL THERAPY | Facility: OTHER | Age: 31
Setting detail: THERAPIES SERIES
End: 2021-01-12
Attending: OBSTETRICS & GYNECOLOGY
Payer: COMMERCIAL

## 2021-01-12 DIAGNOSIS — G57.02 PIRIFORMIS SYNDROME, LEFT: ICD-10-CM

## 2021-01-12 PROCEDURE — 97112 NEUROMUSCULAR REEDUCATION: CPT | Mod: GP

## 2021-01-12 PROCEDURE — 97161 PT EVAL LOW COMPLEX 20 MIN: CPT | Mod: GP

## 2021-01-12 PROCEDURE — 97010 HOT OR COLD PACKS THERAPY: CPT | Mod: GP

## 2021-01-13 NOTE — PROGRESS NOTES
01/12/21 0900   General Information   Type of Visit Initial OP Ortho PT Evaluation   Start of Care Date 01/12/21   Referring Physician Michele Chávez   Patient/Family Goals Statement Decrease pain with work   Orders Evaluate and Treat   Certification Required? No   Medical Diagnosis piriformis syndrome, left   Surgical/Medical history reviewed Yes   Precautions/Limitations no known precautions/limitations   Body Part(s)   Body Part(s) Lumbar Spine/SI   Presentation and Etiology   Pertinent history of current problem (include personal factors and/or comorbidities that impact the POC) Pain began in September, no known injury. Was screened by Dr. Michele Chávez with pelvic US, xray and CT scan, all negative. Pain is located in the left side low back, buttock and anterior hip, worse with laying on that side, turning the left leg certain ways, standing for greater than 2 hours and walking all day at work. Sitting or laying after work, hot showers improve pain as well as rx ibuprofen.     Impairments A. Pain   Functional Limitations perform activities of daily living;perform required work activities;perform desired leisure / sports activities   Symptom Location Left side low back, buttock and anterior hip   How/Where did it occur From insidious onset   Onset date of current episode/exacerbation 09/01/20  (approximate)   Chronicity Chronic   Pain rating (0-10 point scale) Best (/10);Worst (/10)   Best (/10) 0/10   Worst (/10) 5/10   Pain quality C. Aching;G. Cramping   Frequency of pain/symptoms B. Intermittent   Pain/symptoms are: Worse during the day   Pain/symptoms exacerbated by B. Walking;G. Certain positions;L. Work tasks  (standing, sleeping on that side, walking by end of work day)   Progression of symptoms since onset: Unchanged;Other   Pain progression comment improved with ibu use, tried to wean off and pain worsened   Current / Previous Interventions   Diagnostic Tests: X-ray;CT scan;Other  (pelvic ultrasound)  "  X-ray Results unremarkable   CT Results unremarkable   Other diagnostic tests US unremarkable   Prior Level of Function   Prior Level of Function-Mobility Independent   Current Level of Function   Patient role/employment history A. Employed   Employment Comments works in kitchen at [employer]   Fall Risk Screen   Fall screen completed by PT   Have you fallen 2 or more times in the past year? No   Have you fallen and had an injury in the past year? No   Is patient a fall risk? No   Abuse Screen (yes response referral indicated)   Feels Unsafe at Home or Work/School no   Feels Threatened by Someone no   Does Anyone Try to Keep You From Having Contact with Others or Doing Things Outside Your Home? no   Physical Signs of Abuse Present no   System Outcome Measures   Outcome Measures Low Back Pain (see Oswestry and Juan)   Lumbar Spine/SI Objective Findings   Integumentary unremarkable   Posture flattened lumbar, mild rounded shoulders   Gait/Locomotion unremarkable   Flexion ROM 3\" from toes   Extension ROM WNL   Pelvic Screen L sacral base posterior in standing, R FFT +, R stork +   Hip Screen mild L AVELINO + with pulling feeling/stretch; L leg long in supine, L ASIS low   Lumbar/Hip/Knee/Foot Strength Comments deferred today   Hamstring Flexibility mild impairment   Piriformis Flexibility impaired   SLR >70 degrees B   Segmental Mobility L5 FRS L   Palpation tender over lumbar spine, TPs and paraspinals   Planned Therapy Interventions   Planned Therapy Interventions joint mobilization;manual therapy;neuromuscular re-education;ROM;strengthening;stretching   Planned Modality Interventions   Planned Modality Interventions Cryotherapy;Hot packs;Traction;Ultrasound;Electrical stimulation   Clinical Impression   Criteria for Skilled Therapeutic Interventions Met yes, treatment indicated   PT Diagnosis impaired joint and soft tissue mobility   Influenced by the following impairments as above   Functional " limitations due to impairments standing and walking for work, laying on that side   Clinical Presentation Stable/Uncomplicated   Clinical Presentation Rationale no comorbidities   Clinical Decision Making (Complexity) Low complexity   Therapy Frequency 2 times/Week   Predicted Duration of Therapy Intervention (days/wks) up to 3 months   Risk & Benefits of therapy have been explained Yes   Patient, Family & other staff in agreement with plan of care Yes   Clinical Impression Comments Pt demonstrates impaired lumbar facet and sacral hypomobility as well as impaired soft tissue mobility and core strength. She will benefit from skilled PT services to improve tolerance for work duties.   Education Assessment   Preferred Learning Style Listening;Reading;Demonstration;Pictures/video   Barriers to Learning No barriers   ORTHO GOALS   PT Ortho Eval Goals 1;2;3;4   Ortho Goal 1   Goal Identifier mobility   Goal Description Pt will have improved joint mobility to allow performance of work duties with no more than 3/10 pain 75% of the week.   Target Date 02/09/21   Ortho Goal 2   Goal Identifier strength   Goal Description Pt will have improved core strength and postural awareness to promote ability to stand at work for greater than 2 hours with less than 25% increase in pain.   Target Date 03/09/21   Ortho Goal 3   Goal Identifier sleep   Goal Description Pt will have less than 1 sleep disturbance per week due to low back/hip pain when laying on her left side to improve sleep hygiene.   Target Date 03/10/21   Ortho Goal 4   Goal Identifier HEP   Goal Description Pt will be independent and at least 75% compliant with prescribed home exercises for self management of symptoms.   Target Date 02/10/21   Total Evaluation Time   PT Eval, Low Complexity Minutes (36917) 30

## 2021-01-15 ENCOUNTER — HOSPITAL ENCOUNTER (OUTPATIENT)
Dept: PHYSICAL THERAPY | Facility: OTHER | Age: 31
Setting detail: THERAPIES SERIES
End: 2021-01-15
Attending: OBSTETRICS & GYNECOLOGY
Payer: COMMERCIAL

## 2021-01-15 PROCEDURE — 97140 MANUAL THERAPY 1/> REGIONS: CPT | Mod: GP

## 2021-01-15 PROCEDURE — 97112 NEUROMUSCULAR REEDUCATION: CPT | Mod: GP

## 2021-01-15 PROCEDURE — 97110 THERAPEUTIC EXERCISES: CPT | Mod: GP

## 2021-01-18 ENCOUNTER — HOSPITAL ENCOUNTER (OUTPATIENT)
Dept: PHYSICAL THERAPY | Facility: OTHER | Age: 31
Setting detail: THERAPIES SERIES
End: 2021-01-18
Attending: OBSTETRICS & GYNECOLOGY
Payer: COMMERCIAL

## 2021-01-18 PROCEDURE — 97140 MANUAL THERAPY 1/> REGIONS: CPT | Mod: GP

## 2021-01-18 PROCEDURE — 97110 THERAPEUTIC EXERCISES: CPT | Mod: GP

## 2021-01-18 PROCEDURE — 97112 NEUROMUSCULAR REEDUCATION: CPT | Mod: GP

## 2021-01-20 ENCOUNTER — HOSPITAL ENCOUNTER (OUTPATIENT)
Dept: PHYSICAL THERAPY | Facility: OTHER | Age: 31
Setting detail: THERAPIES SERIES
End: 2021-01-20
Attending: OBSTETRICS & GYNECOLOGY
Payer: COMMERCIAL

## 2021-01-20 PROCEDURE — 97110 THERAPEUTIC EXERCISES: CPT | Mod: GP

## 2021-01-20 PROCEDURE — 97140 MANUAL THERAPY 1/> REGIONS: CPT | Mod: GP

## 2021-01-29 ENCOUNTER — HOSPITAL ENCOUNTER (OUTPATIENT)
Dept: PHYSICAL THERAPY | Facility: OTHER | Age: 31
Setting detail: THERAPIES SERIES
End: 2021-01-29
Attending: PHYSICAL MEDICINE & REHABILITATION
Payer: COMMERCIAL

## 2021-01-29 PROCEDURE — 97110 THERAPEUTIC EXERCISES: CPT | Mod: GP

## 2021-01-29 PROCEDURE — 97140 MANUAL THERAPY 1/> REGIONS: CPT | Mod: GP

## 2021-02-02 ENCOUNTER — HOSPITAL ENCOUNTER (OUTPATIENT)
Dept: PHYSICAL THERAPY | Facility: OTHER | Age: 31
Setting detail: THERAPIES SERIES
End: 2021-02-02
Attending: PHYSICAL MEDICINE & REHABILITATION
Payer: COMMERCIAL

## 2021-02-02 PROCEDURE — 97110 THERAPEUTIC EXERCISES: CPT | Mod: GP

## 2021-02-02 PROCEDURE — 97140 MANUAL THERAPY 1/> REGIONS: CPT | Mod: GP

## 2021-02-09 ENCOUNTER — HOSPITAL ENCOUNTER (OUTPATIENT)
Dept: PHYSICAL THERAPY | Facility: OTHER | Age: 31
Setting detail: THERAPIES SERIES
End: 2021-02-09
Attending: PHYSICAL MEDICINE & REHABILITATION
Payer: COMMERCIAL

## 2021-02-09 PROCEDURE — 97140 MANUAL THERAPY 1/> REGIONS: CPT | Mod: GP

## 2021-02-09 PROCEDURE — 97110 THERAPEUTIC EXERCISES: CPT | Mod: GP

## 2021-02-09 NOTE — PROGRESS NOTES
Outpatient Physical Therapy Discharge Note     Patient: Shaista Esqueda  : 1990    Beginning/End Dates of Reporting Period:  21 to 2021    Referring Provider: Michele Chávez MD    Therapy Diagnosis: impaired joint and soft tissue mobility     Client Self Report: Last week at work was difficult by the end of the long week. The stretching does help. Still feels about 85-90% improvement in her symptoms overall. Denies pain currently.    Objective Measurements:  Objective Measure: pelvic screen  Details: equal sacrum; neg stork; FFT neg  Objective Measure: segmental mobility  Details: good         Outcome Measures (most recent score):  Juan STarT    Juan STarT    Oswestry Score (%): 4 % (improved from 12% impairment 21)    Goals:  Goal Identifier mobility   Goal Description Pt will have improved joint mobility to allow performance of work duties with no more than 3/10 pain 75% of the week.   Target Date 21   Date Met  21   Progress:     Goal Identifier strength   Goal Description Pt will have improved core strength and postural awareness to promote ability to stand at work for greater than 2 hours with less than 25% increase in pain.   Target Date 21   Date Met  21   Progress:     Goal Identifier sleep   Goal Description Pt will have less than 1 sleep disturbance per week due to low back/hip pain when laying on her left side to improve sleep hygiene.   Target Date 03/10/21   Date Met  21   Progress:     Goal Identifier HEP   Goal Description Pt will be independent and at least 75% compliant with prescribed home exercises for self management of symptoms.   Target Date 02/10/21   Date Met  21   Progress:       Progress Toward Goals:   Progress this reporting period: all goals met          Plan:  Discharge from therapy.    Discharge:    Reason for Discharge: Patient has met all goals.  Patient chooses to discontinue therapy.    Equipment Issued: n/a    Discharge  Plan: Patient to continue home program.

## 2021-08-29 ENCOUNTER — OFFICE VISIT (OUTPATIENT)
Dept: FAMILY MEDICINE | Facility: OTHER | Age: 31
End: 2021-08-29
Attending: NURSE PRACTITIONER
Payer: COMMERCIAL

## 2021-08-29 VITALS
DIASTOLIC BLOOD PRESSURE: 68 MMHG | TEMPERATURE: 98.1 F | SYSTOLIC BLOOD PRESSURE: 124 MMHG | WEIGHT: 217.3 LBS | HEART RATE: 93 BPM | BODY MASS INDEX: 36.16 KG/M2 | OXYGEN SATURATION: 99 % | RESPIRATION RATE: 18 BRPM

## 2021-08-29 DIAGNOSIS — S46.912A SHOULDER STRAIN, LEFT, INITIAL ENCOUNTER: Primary | ICD-10-CM

## 2021-08-29 PROCEDURE — 99213 OFFICE O/P EST LOW 20 MIN: CPT | Performed by: FAMILY MEDICINE

## 2021-08-29 RX ORDER — IBUPROFEN 600 MG/1
600 TABLET, FILM COATED ORAL EVERY 6 HOURS PRN
Qty: 100 TABLET | Refills: 1 | Status: SHIPPED | OUTPATIENT
Start: 2021-08-29 | End: 2021-08-29

## 2021-08-29 RX ORDER — IBUPROFEN 600 MG/1
600 TABLET, FILM COATED ORAL EVERY 6 HOURS PRN
Qty: 100 TABLET | Refills: 1 | Status: SHIPPED | OUTPATIENT
Start: 2021-08-29

## 2021-08-29 ASSESSMENT — PAIN SCALES - GENERAL: PAINLEVEL: MODERATE PAIN (4)

## 2021-08-29 NOTE — NURSING NOTE
"Chief Complaint   Patient presents with     Musculoskeletal Problem     left shoulder to arm     Patient is here for pain in her left shoulder that goes down her arm that started 5-6 days ago. Patient tried icy hot but is unsure if it helped since she fell asleep.     Initial /68   Pulse 93   Temp 98.1  F (36.7  C) (Tympanic)   Resp 18   Wt 98.6 kg (217 lb 4.8 oz)   SpO2 99%   BMI (P) 36.16 kg/m   Estimated body mass index is 36.16 kg/m  (pended) as calculated from the following:    Height as of 11/19/20: (P) 1.651 m (5' 5\").    Weight as of this encounter: 98.6 kg (217 lb 4.8 oz).  Medication Reconciliation: complete    Venita Alcantara LPN  "

## 2021-08-29 NOTE — PROGRESS NOTES
"Nursing Notes:   Venita Alcantara LPN  8/29/2021  1:13 PM  Signed  Chief Complaint   Patient presents with     Musculoskeletal Problem     left shoulder to arm     Patient is here for pain in her left shoulder that goes down her arm that started 5-6 days ago. Patient tried icy hot but is unsure if it helped since she fell asleep.     Initial /68   Pulse 93   Temp 98.1  F (36.7  C) (Tympanic)   Resp 18   Wt 98.6 kg (217 lb 4.8 oz)   SpO2 99%   BMI (P) 36.16 kg/m   Estimated body mass index is 36.16 kg/m  (pended) as calculated from the following:    Height as of 11/19/20: (P) 1.651 m (5' 5\").    Weight as of this encounter: 98.6 kg (217 lb 4.8 oz).  Medication Reconciliation: complete    Venita Alcantara LPN       Assessment & Plan       ICD-10-CM    1. Shoulder strain, left, initial encounter  S46.912A ibuprofen (ADVIL/MOTRIN) 600 MG tablet     DISCONTINUED: ibuprofen (ADVIL/MOTRIN) 600 MG tablet     Exam fitting for muscular tenderness.  This makes other etiologies much less likely  We discussed potential for left cervical radiculopathy given the area of discomfort and some left hand numbness, but her exam is not entirely consistent with a radiculopathy and with intermittent symptoms, radiculopathy is not clearly present.  More likely muscular strain.    Demonstrated some stretches to try  Use ibuprofen 600 mg 3 times daily with food for 5 to 7 days  If not improving, consider physical therapy. She elected against referral at this time  If she has more left arm numbness, tingling, or develops weakness, then consider MRI    Follow up as needed       Theron Bryan MD     [facility]      SUBJECTIVE:  31 year old female presents to [facility] for left upper chest and scapular pain for 5-6 days. Radiates to elbow at times.  No known injury  Some tingling in hand last night  Taking ibuprofen sporadically  On omeprazole for GERD, no history of ulcer  Not SOB      REVIEW OF SYSTEMS:  "   As above    Current Outpatient Medications   Medication Sig Dispense Refill     calcium carbonate (TUMS) 500 MG chewable tablet Take 1 chew tab by mouth 2 times daily       ibuprofen (ADVIL/MOTRIN) 600 MG tablet Take 1 tablet (600 mg) by mouth every 6 hours as needed for moderate pain 100 tablet 1     omeprazole (PRILOSEC) 20 MG DR capsule Take 1 capsule (20 mg) by mouth 2 times daily 180 capsule 3     varenicline (CHANTIX ZOILA) 0.5 MG X 11 & 1 MG X 42 tablet Take 0.5 mg tab daily for 3 days, THEN 0.5 mg tab twice daily for 4 days, THEN 1 mg twice daily. (Patient not taking: Reported on 11/19/2020) 53 tablet 0     varenicline (CHANTIX) 1 MG tablet Take 1 tablet (1 mg) by mouth 2 times daily (Patient not taking: Reported on 11/19/2020) 60 tablet 3     Allergies   Allergen Reactions     Amoxicillin Hives     Cefaclor Unknown     Sulfamethoxazole-Trimethoprim Unknown       OBJECTIVE:  /68   Pulse 93   Temp 98.1  F (36.7  C) (Tympanic)   Resp 18   Wt 98.6 kg (217 lb 4.8 oz)   SpO2 99%   BMI (P) 36.16 kg/m      EXAM:  General Appearance: Alert. No acute distress  Musculoskeletal: No posterior neck tenderness.  Negative Spurling sign  Mild anterior left shoulder muscle tenderness.  No posterior tenderness  Rotator cuff testing intact without increase in pain.  Negative Pereira impingement  Strength testing in left arm causes some increase in anterior shoulder pain.  Biceps tendon is nontender to palpation  Neurological: Strength 5 out of 5 bilateral upper extremities  Psychiatric: Normal affect and mentation

## 2021-08-29 NOTE — PATIENT INSTRUCTIONS
Take ibuprofen 600 mg three times daily for food for 5-7 days  Try the stretches   If not improving or you have more consistent numbness or tingling in the hand then may need more testing

## 2021-09-26 ENCOUNTER — LAB REQUISITION (OUTPATIENT)
Dept: LAB | Facility: OTHER | Age: 31
End: 2021-09-26

## 2021-09-26 DIAGNOSIS — R05.9 COUGH: ICD-10-CM

## 2021-09-26 LAB — SARS-COV-2 RNA RESP QL NAA+PROBE: NEGATIVE

## 2021-09-26 PROCEDURE — U0003 INFECTIOUS AGENT DETECTION BY NUCLEIC ACID (DNA OR RNA); SEVERE ACUTE RESPIRATORY SYNDROME CORONAVIRUS 2 (SARS-COV-2) (CORONAVIRUS DISEASE [COVID-19]), AMPLIFIED PROBE TECHNIQUE, MAKING USE OF HIGH THROUGHPUT TECHNOLOGIES AS DESCRIBED BY CMS-2020-01-R: HCPCS | Performed by: FAMILY MEDICINE

## 2021-11-03 ENCOUNTER — LAB REQUISITION (OUTPATIENT)
Dept: LAB | Facility: OTHER | Age: 31
End: 2021-11-03

## 2021-11-03 DIAGNOSIS — Z11.52 ENCOUNTER FOR SCREENING FOR COVID-19: ICD-10-CM

## 2021-11-03 LAB — SARS-COV-2 RNA RESP QL NAA+PROBE: NEGATIVE

## 2021-11-03 PROCEDURE — U0005 INFEC AGEN DETEC AMPLI PROBE: HCPCS | Performed by: FAMILY MEDICINE

## 2022-02-12 ENCOUNTER — LAB REQUISITION (OUTPATIENT)
Dept: LAB | Facility: OTHER | Age: 32
End: 2022-02-12

## 2022-02-12 LAB
FLUAV RNA SPEC QL NAA+PROBE: NEGATIVE
FLUBV RNA RESP QL NAA+PROBE: NEGATIVE
RSV RNA SPEC NAA+PROBE: NEGATIVE
SARS-COV-2 RNA RESP QL NAA+PROBE: NEGATIVE

## 2022-02-12 PROCEDURE — 87637 SARSCOV2&INF A&B&RSV AMP PRB: CPT | Performed by: FAMILY MEDICINE

## 2023-05-09 ENCOUNTER — HOSPITAL ENCOUNTER (EMERGENCY)
Facility: OTHER | Age: 33
Discharge: HOME | End: 2023-05-09
Attending: EMERGENCY MEDICINE | Admitting: EMERGENCY MEDICINE
Payer: COMMERCIAL

## 2023-05-09 VITALS
SYSTOLIC BLOOD PRESSURE: 140 MMHG | DIASTOLIC BLOOD PRESSURE: 86 MMHG | BODY MASS INDEX: 37.49 KG/M2 | OXYGEN SATURATION: 96 % | RESPIRATION RATE: 18 BRPM | TEMPERATURE: 98.2 F | HEART RATE: 65 BPM | WEIGHT: 225 LBS | HEIGHT: 65 IN

## 2023-05-09 DIAGNOSIS — R00.2 PALPITATIONS: ICD-10-CM

## 2023-05-09 LAB
ALBUMIN SERPL BCG-MCNC: 4.3 G/DL (ref 3.5–5.2)
ALBUMIN UR-MCNC: NEGATIVE MG/DL
ALP SERPL-CCNC: 74 U/L (ref 35–104)
ALT SERPL W P-5'-P-CCNC: 17 U/L (ref 10–35)
ANION GAP SERPL CALCULATED.3IONS-SCNC: 12 MMOL/L (ref 7–15)
APPEARANCE UR: CLEAR
AST SERPL W P-5'-P-CCNC: 15 U/L (ref 10–35)
BACTERIA #/AREA URNS HPF: ABNORMAL /HPF
BASOPHILS # BLD AUTO: 0.1 10E3/UL (ref 0–0.2)
BASOPHILS NFR BLD AUTO: 1 %
BILIRUB SERPL-MCNC: 0.2 MG/DL
BILIRUB UR QL STRIP: NEGATIVE
BUN SERPL-MCNC: 17.5 MG/DL (ref 6–20)
CALCIUM SERPL-MCNC: 9 MG/DL (ref 8.6–10)
CHLORIDE SERPL-SCNC: 101 MMOL/L (ref 98–107)
COLOR UR AUTO: ABNORMAL
CREAT SERPL-MCNC: 0.83 MG/DL (ref 0.51–0.95)
DEPRECATED HCO3 PLAS-SCNC: 24 MMOL/L (ref 22–29)
EOSINOPHIL # BLD AUTO: 0.3 10E3/UL (ref 0–0.7)
EOSINOPHIL NFR BLD AUTO: 3 %
ERYTHROCYTE [DISTWIDTH] IN BLOOD BY AUTOMATED COUNT: 14.1 % (ref 10–15)
GFR SERPL CREATININE-BSD FRML MDRD: >90 ML/MIN/1.73M2
GLUCOSE SERPL-MCNC: 92 MG/DL (ref 70–99)
GLUCOSE UR STRIP-MCNC: NEGATIVE MG/DL
HCT VFR BLD AUTO: 41.7 % (ref 35–47)
HGB BLD-MCNC: 13.8 G/DL (ref 11.7–15.7)
HGB UR QL STRIP: ABNORMAL
IMM GRANULOCYTES # BLD: 0 10E3/UL
IMM GRANULOCYTES NFR BLD: 0 %
KETONES UR STRIP-MCNC: NEGATIVE MG/DL
LACTATE SERPL-SCNC: 0.7 MMOL/L (ref 0.7–2)
LEUKOCYTE ESTERASE UR QL STRIP: NEGATIVE
LYMPHOCYTES # BLD AUTO: 3 10E3/UL (ref 0.8–5.3)
LYMPHOCYTES NFR BLD AUTO: 30 %
MAGNESIUM SERPL-MCNC: 2.1 MG/DL (ref 1.7–2.3)
MCH RBC QN AUTO: 27.5 PG (ref 26.5–33)
MCHC RBC AUTO-ENTMCNC: 33.1 G/DL (ref 31.5–36.5)
MCV RBC AUTO: 83 FL (ref 78–100)
MONOCYTES # BLD AUTO: 1 10E3/UL (ref 0–1.3)
MONOCYTES NFR BLD AUTO: 10 %
MUCOUS THREADS #/AREA URNS LPF: PRESENT /LPF
NEUTROPHILS # BLD AUTO: 5.6 10E3/UL (ref 1.6–8.3)
NEUTROPHILS NFR BLD AUTO: 56 %
NITRATE UR QL: NEGATIVE
NRBC # BLD AUTO: 0 10E3/UL
NRBC BLD AUTO-RTO: 0 /100
PH UR STRIP: 5.5 [PH] (ref 5–9)
PLATELET # BLD AUTO: 272 10E3/UL (ref 150–450)
POTASSIUM SERPL-SCNC: 3.9 MMOL/L (ref 3.4–5.3)
PROT SERPL-MCNC: 6.8 G/DL (ref 6.4–8.3)
RBC # BLD AUTO: 5.02 10E6/UL (ref 3.8–5.2)
RBC URINE: 1 /HPF
SODIUM SERPL-SCNC: 137 MMOL/L (ref 136–145)
SP GR UR STRIP: 1.02 (ref 1–1.03)
UROBILINOGEN UR STRIP-MCNC: NORMAL MG/DL
WBC # BLD AUTO: 9.9 10E3/UL (ref 4–11)
WBC URINE: 2 /HPF

## 2023-05-09 PROCEDURE — 83605 ASSAY OF LACTIC ACID: CPT | Performed by: EMERGENCY MEDICINE

## 2023-05-09 PROCEDURE — 250N000013 HC RX MED GY IP 250 OP 250 PS 637: Performed by: EMERGENCY MEDICINE

## 2023-05-09 PROCEDURE — 85025 COMPLETE CBC W/AUTO DIFF WBC: CPT | Performed by: EMERGENCY MEDICINE

## 2023-05-09 PROCEDURE — 99284 EMERGENCY DEPT VISIT MOD MDM: CPT | Performed by: EMERGENCY MEDICINE

## 2023-05-09 PROCEDURE — 81001 URINALYSIS AUTO W/SCOPE: CPT | Performed by: EMERGENCY MEDICINE

## 2023-05-09 PROCEDURE — 93010 ELECTROCARDIOGRAM REPORT: CPT | Performed by: STUDENT IN AN ORGANIZED HEALTH CARE EDUCATION/TRAINING PROGRAM

## 2023-05-09 PROCEDURE — 93005 ELECTROCARDIOGRAM TRACING: CPT | Performed by: EMERGENCY MEDICINE

## 2023-05-09 PROCEDURE — 80053 COMPREHEN METABOLIC PANEL: CPT | Performed by: EMERGENCY MEDICINE

## 2023-05-09 PROCEDURE — 83735 ASSAY OF MAGNESIUM: CPT | Performed by: EMERGENCY MEDICINE

## 2023-05-09 PROCEDURE — 36415 COLL VENOUS BLD VENIPUNCTURE: CPT | Performed by: EMERGENCY MEDICINE

## 2023-05-09 RX ORDER — METOPROLOL TARTRATE 25 MG/1
25 TABLET, FILM COATED ORAL ONCE
Status: COMPLETED | OUTPATIENT
Start: 2023-05-09 | End: 2023-05-09

## 2023-05-09 RX ORDER — METOPROLOL TARTRATE 25 MG/1
25 TABLET, FILM COATED ORAL 2 TIMES DAILY
Qty: 60 TABLET | Refills: 0 | Status: SHIPPED | OUTPATIENT
Start: 2023-05-09 | End: 2023-06-08

## 2023-05-09 RX ADMIN — METOPROLOL TARTRATE 25 MG: 25 TABLET, FILM COATED ORAL at 21:24

## 2023-05-09 ASSESSMENT — ENCOUNTER SYMPTOMS
AGITATION: 0
LIGHT-HEADEDNESS: 0
VOMITING: 0
DYSURIA: 0
CHEST TIGHTNESS: 0
PALPITATIONS: 1
FEVER: 0
CHILLS: 0
ARTHRALGIAS: 0
NAUSEA: 0
SHORTNESS OF BREATH: 0

## 2023-05-09 ASSESSMENT — ACTIVITIES OF DAILY LIVING (ADL): ADLS_ACUITY_SCORE: 35

## 2023-05-09 NOTE — ED TRIAGE NOTES
"Pt presents for palpitations x 2 days, describes as not a racing heart but \"heavy beats\" that is consistent throughout the day, relieved by sleeping. Describes maybe a slight pain in the chest that is very minimal. Denies shortness of breath, sometimes feels urge to take deep breaths but feels able to fill lungs adequately.     BP (!) 147/79   Pulse 51   Temp 98.2  F (36.8  C) (Tympanic)   Resp 16   Ht 1.651 m (5' 5\")   Wt 102.1 kg (225 lb)   SpO2 97%   BMI 37.44 kg/m         Triage Assessment     Row Name 05/09/23 0262       Triage Assessment (Adult)    Airway WDL WDL       Respiratory WDL    Respiratory WDL WDL       Skin Circulation/Temperature WDL    Skin Circulation/Temperature WDL WDL       Cardiac WDL    Cardiac WDL X;chest pain       Peripheral/Neurovascular WDL    Peripheral Neurovascular WDL WDL       Cognitive/Neuro/Behavioral WDL    Cognitive/Neuro/Behavioral WDL WDL              "

## 2023-05-10 ENCOUNTER — HOSPITAL ENCOUNTER (OUTPATIENT)
Dept: CARDIOLOGY | Facility: OTHER | Age: 33
Discharge: HOME | End: 2023-05-10
Attending: EMERGENCY MEDICINE | Admitting: EMERGENCY MEDICINE
Payer: COMMERCIAL

## 2023-05-10 DIAGNOSIS — R00.2 PALPITATIONS: ICD-10-CM

## 2023-05-10 PROCEDURE — 93248 EXT ECG>7D<15D REV&INTERPJ: CPT | Performed by: INTERNAL MEDICINE

## 2023-05-10 PROCEDURE — 999N000157 HC STATISTIC RCP TIME EA 10 MIN

## 2023-05-10 PROCEDURE — 93246 EXT ECG>7D<15D RECORDING: CPT

## 2023-05-10 NOTE — ED PROVIDER NOTES
History     Chief Complaint   Patient presents with     Palpitations     HPI  Shaista Esqueda is a 32 year old female who is here reporting a history of palpitations for the last couple of days.  She says quite consistently throughout the last couple days she has been feeling heavy heartbeats.  She says she will usually get a couple of them in a row.  They are not painful, however she did think that she had a little bit of slight pain at times and she points to the left upper anterior chest wall.  Denies feeling short of breath lightheaded or dizzy diaphoretic or nauseous with these.  Occasionally she says she feels like she has to take a deep breath but not short of breath.  Has been feeling well otherwise.  No recent change in diet or medications.  She does drink caffeine but has not had a change in how much she is taking.  She does drink alcohol, she states usually 2 drinks per day and that has not changed recently.  She did work a bunch this week and so may be a little bit less sleep than normal.    Allergies:  Allergies   Allergen Reactions     Amoxicillin Hives     Cefaclor Unknown     Sulfamethoxazole-Trimethoprim Unknown       Problem List:    Patient Active Problem List    Diagnosis Date Noted     Family history of malignant neoplasm of breast 01/30/2018     Priority: Medium     Family history of ischemic heart disease 01/30/2018     Priority: Medium     Contraceptive management 08/08/2012     Priority: Medium     Dysmenorrhea 08/22/2011     Priority: Medium     Encounter for sterilization 08/22/2011     Priority: Medium     Surveillance of previously prescribed contraceptive pill 05/25/2010     Priority: Medium     Temporomandibular joint disorder 05/25/2010     Priority: Medium        Past Medical History:    No past medical history on file.    Past Surgical History:    Past Surgical History:   Procedure Laterality Date     OTHER SURGICAL HISTORY      83140.0,PAST SURGICAL HISTORY,None       Family  History:    Family History   Problem Relation Age of Onset     Family History Negative Mother         Good Health     Thyroid Disease Mother         Thyroid Disease     Breast Cancer Maternal Aunt 60        Cancer-breast     Hypertension Father      Heart Disease Maternal Grandfather         Heart Disease,Family History Coronary Heart Disease male < 55 - m uncle, mgf     Breast Cancer Maternal Aunt 60        Cancer-breast     Prostate Cancer Maternal Uncle         Cancer-prostate     Thyroid Disease Maternal Uncle         Thyroid Disease     Seizure Disorder Maternal Aunt         Seizures     Heart Disease Paternal Uncle 54        Heart Disease,MI     Ovarian Cancer No family hx of         Cancer-ovarian     Anesthesia Reaction No family hx of         Anesthesia Problem     Blood Disease No family hx of         Blood Disease     Other - See Comments No family hx of         Stroke     Colon Cancer No family hx of         Cancer-colon     Hyperlipidemia No family hx of         Hyperlipidemia       Social History:  Marital Status:  Single [1]  Social History     Tobacco Use     Smoking status: Every Day     Packs/day: 0.25     Years: 8.00     Pack years: 2.00     Types: Cigarettes     Smokeless tobacco: Never   Vaping Use     Vaping status: Never Used   Substance Use Topics     Alcohol use: Yes     Comment: Alcoholic Drinks/day: Socially     Drug use: Never     Comment: Drug use: No        Medications:    metoprolol tartrate (LOPRESSOR) 25 MG tablet  calcium carbonate (TUMS) 500 MG chewable tablet  ibuprofen (ADVIL/MOTRIN) 600 MG tablet  omeprazole (PRILOSEC) 20 MG DR capsule  varenicline (CHANTIX ZOILA) 0.5 MG X 11 & 1 MG X 42 tablet  varenicline (CHANTIX) 1 MG tablet          Review of Systems   Constitutional: Negative for chills and fever.   HENT: Negative for congestion.    Eyes: Negative for visual disturbance.   Respiratory: Negative for chest tightness and shortness of breath.    Cardiovascular: Positive for  "palpitations.   Gastrointestinal: Negative for nausea and vomiting.   Genitourinary: Negative for dysuria.   Musculoskeletal: Negative for arthralgias.   Skin: Negative for rash.   Neurological: Negative for light-headedness.   Psychiatric/Behavioral: Negative for agitation.       Physical Exam   BP: (!) 147/79  Pulse: 51  Temp: 98.2  F (36.8  C)  Resp: 16  Height: 165.1 cm (5' 5\")  Weight: 102.1 kg (225 lb)  SpO2: 97 %      Physical Exam  Vitals and nursing note reviewed.   Constitutional:       Appearance: Normal appearance.   HENT:      Head: Normocephalic and atraumatic.      Mouth/Throat:      Mouth: Mucous membranes are moist.   Cardiovascular:      Rate and Rhythm: Normal rate and regular rhythm.      Heart sounds: Normal heart sounds.   Pulmonary:      Effort: Pulmonary effort is normal.      Breath sounds: Normal breath sounds.   Abdominal:      General: Abdomen is flat.   Skin:     General: Skin is warm and dry.   Neurological:      Mental Status: She is alert and oriented to person, place, and time.   Psychiatric:         Behavior: Behavior normal.         ED Course                 Procedures         EKG shows sinus rhythm 69 bpm.  No PVCs on the EKG, however watching the monitor she does have frequent unifocal PVCs.       Results for orders placed or performed during the hospital encounter of 05/09/23 (from the past 24 hour(s))   CBC with platelets differential    Narrative    The following orders were created for panel order CBC with platelets differential.  Procedure                               Abnormality         Status                     ---------                               -----------         ------                     CBC with platelets and d...[[id number]]                      Final result                 Please view results for these tests on the individual orders.   Comprehensive metabolic panel   Result Value Ref Range    Sodium 137 136 - 145 mmol/L    Potassium 3.9 3.4 - 5.3 mmol/L    " Chloride 101 98 - 107 mmol/L    Carbon Dioxide (CO2) 24 22 - 29 mmol/L    Anion Gap 12 7 - 15 mmol/L    Urea Nitrogen 17.5 6.0 - 20.0 mg/dL    Creatinine 0.83 0.51 - 0.95 mg/dL    Calcium 9.0 8.6 - 10.0 mg/dL    Glucose 92 70 - 99 mg/dL    Alkaline Phosphatase 74 35 - 104 U/L    AST 15 10 - 35 U/L    ALT 17 10 - 35 U/L    Protein Total 6.8 6.4 - 8.3 g/dL    Albumin 4.3 3.5 - 5.2 g/dL    Bilirubin Total 0.2 <=1.2 mg/dL    GFR Estimate >90 >60 mL/min/1.73m2   Lactic acid whole blood   Result Value Ref Range    Lactic Acid 0.7 0.7 - 2.0 mmol/L   CBC with platelets and differential   Result Value Ref Range    WBC Count 9.9 4.0 - 11.0 10e3/uL    RBC Count 5.02 3.80 - 5.20 10e6/uL    Hemoglobin 13.8 11.7 - 15.7 g/dL    Hematocrit 41.7 35.0 - 47.0 %    MCV 83 78 - 100 fL    MCH 27.5 26.5 - 33.0 pg    MCHC 33.1 31.5 - 36.5 g/dL    RDW 14.1 10.0 - 15.0 %    Platelet Count 272 150 - 450 10e3/uL    % Neutrophils 56 %    % Lymphocytes 30 %    % Monocytes 10 %    % Eosinophils 3 %    % Basophils 1 %    % Immature Granulocytes 0 %    NRBCs per 100 WBC 0 <1 /100    Absolute Neutrophils 5.6 1.6 - 8.3 10e3/uL    Absolute Lymphocytes 3.0 0.8 - 5.3 10e3/uL    Absolute Monocytes 1.0 0.0 - 1.3 10e3/uL    Absolute Eosinophils 0.3 0.0 - 0.7 10e3/uL    Absolute Basophils 0.1 0.0 - 0.2 10e3/uL    Absolute Immature Granulocytes 0.0 <=0.4 10e3/uL    Absolute NRBCs 0.0 10e3/uL   Magnesium   Result Value Ref Range    Magnesium 2.1 1.7 - 2.3 mg/dL   UA with Microscopic reflex to Culture    Specimen: Urine, Clean Catch   Result Value Ref Range    Color Urine Light Yellow Colorless, Straw, Light Yellow, Yellow    Appearance Urine Clear Clear    Glucose Urine Negative Negative mg/dL    Bilirubin Urine Negative Negative    Ketones Urine Negative Negative mg/dL    Specific Gravity Urine 1.020 1.000 - 1.030    Blood Urine Small (A) Negative    pH Urine 5.5 5.0 - 9.0    Protein Albumin Urine Negative Negative mg/dL    Urobilinogen Urine Normal Normal,  2.0 mg/dL    Nitrite Urine Negative Negative    Leukocyte Esterase Urine Negative Negative    Bacteria Urine Few (A) None Seen /HPF    Mucus Urine Present (A) None Seen /LPF    RBC Urine 1 <=2 /HPF    WBC Urine 2 <=5 /HPF    Narrative    Urine Culture not indicated       Medications   metoprolol tartrate (LOPRESSOR) tablet 25 mg (has no administration in time range)       Assessments & Plan (with Medical Decision Making)     I have reviewed the nursing notes.    I have reviewed the findings, diagnosis, plan and need for follow up with the patient.  Patient here with experiencing palpitations and on the monitor has frequent unifocal PVCs.  Electrolytes and other labs all very reassuring.  I will place her on a Zio patch.  I am going to give her a prescription for metoprolol 25 mg twice daily as needed for palpitations.  She has an appointment already scheduled in clinic at the end of the month and she can follow-up with this then as well.  In the meantime return here sooner if worse.        New Prescriptions    METOPROLOL TARTRATE (LOPRESSOR) 25 MG TABLET    Take 1 tablet (25 mg) by mouth 2 times daily for 30 days       Final diagnoses:   Palpitations       5/9/2023   [facility]     Oswald Pack MD  05/09/23 0248

## 2023-05-10 NOTE — DISCHARGE INSTRUCTIONS
You could take the metoprolol twice a day.  If you can monitor your blood pressure and make sure that it stays about 120 or more prior to taking that would be best.  Follow-up in clinic or return here if worse.

## 2023-05-10 NOTE — PATIENT INSTRUCTIONS
Date Placed on Pt:  05/10/2023    Patient instructed not to:   -take baths, swim, sauna   -remove device prior to 14 days   -use electric blankets   -shower or sweat excessively within first 24 hours of device application  Patient instructed to:   -shower as needed   -be carefull when toweling off and dressing   -press button when cardiac symptoms occur   -document symptoms in log book   -remove and return device (send via mail to [employer])   -Call [email or web address] with any questions

## 2023-05-12 LAB
ATRIAL RATE - MUSE: 69 BPM
DIASTOLIC BLOOD PRESSURE - MUSE: NORMAL MMHG
INTERPRETATION ECG - MUSE: NORMAL
P AXIS - MUSE: 41 DEGREES
PR INTERVAL - MUSE: 136 MS
QRS DURATION - MUSE: 90 MS
QT - MUSE: 410 MS
QTC - MUSE: 439 MS
R AXIS - MUSE: 0 DEGREES
SYSTOLIC BLOOD PRESSURE - MUSE: NORMAL MMHG
T AXIS - MUSE: 12 DEGREES
VENTRICULAR RATE- MUSE: 69 BPM

## 2023-05-27 ENCOUNTER — MYC MEDICAL ADVICE (OUTPATIENT)
Dept: FAMILY MEDICINE | Facility: OTHER | Age: 33
End: 2023-05-27
Payer: COMMERCIAL

## 2023-05-27 NOTE — PROGRESS NOTES
"  Assessment & Plan     1. PVC's (premature ventricular contractions)  2. Follow-up examination  Chronic, improving since ER visit on beta blocker; but with some side effects of dizziness.  Change to XL version and trial 1/2 dose.  Can use up her metoprolol tartrate and take 1/2 tablet twice a day.  Changing to metoprolol succinate 1/2 tablet DAILY when out of medications.  Will collect blood today to screen thyroid studies; otherwise reassured labs from ER were normal.  Zio monitor was just mailed back prior to the holiday weekend; therefore will await results.  Discussed continued metoprolol vs possible referral to Cardiology pending Zio monitor results.  - metoprolol succinate ER (TOPROL XL) 25 MG 24 hr tablet; Take 0.5 tablets (12.5 mg) by mouth daily  Dispense: 45 tablet; Refill: 4  - TSH; Future  - T4, Free; Future  - T4, Free  - TSH    2. Need for diphtheria-tetanus-pertussis (Tdap) vaccine  Updated today.    MDM:  Ordering of each unique test  Prescription drug management     Nicotine/Tobacco Cessation:  She reports that she has quit smoking. Her smoking use included cigarettes. She has a 2.00 pack-year smoking history. She has never used smokeless tobacco.  Nicotine/Tobacco Cessation Plan:   as above; patient has quit smoking.    BMI:   Estimated body mass index is 38.27 kg/m  as calculated from the following:    Height as of 5/9/23: 1.651 m (5' 5\").    Weight as of this encounter: 104.3 kg (230 lb).   Weight management plan: Discussed healthy diet and exercise guidelines    Follow up pending Zio monitor results (still pending at time of this note).    Saray Reyes, [facility] is a 32 year old, presenting for the following health issues:  RECHECK (ER follow-up)    History of Present Illness       Vascular Disease:  She presents for follow up of vascular disease.  She never takes nitroglycerin. She is not taking daily aspirin.    She eats 2-3 servings " "of fruits and vegetables daily.She consumes 1 sweetened beverage(s) daily.She exercises with enough effort to increase her heart rate 10 to 19 minutes per day.  She exercises with enough effort to increase her heart rate 3 or less days per week.   She is taking medications regularly.       ED/UC Followup:    Facility:  [facility] ER  Date of visit: 5/9/2023  Reason for visit: Palpitations    Wore a Zio monitor, results still \"in process.\"  Will check with RT to determine if we can see preliminary report or have the results.  Was placed on metoprolol tartrate 25mg BID.    Will feel a \"heavy heartbeat\" or a couple in a row.  No dizziness, SOB, diaphoresis, or N/V with these events.  Does get a sensation or feeling in L upper chest.  + caffeine, + alcohol - but no significant change in amount recently.   Has quit smoking!    EKG and labs normal during ER work up.  It was noted that she has frequent unifocal PVCs on cardiac monitoring; however this was not noted on EKG.    Currently: Since being on beta-blocker; hasn't had much for PVC sensation, like what was occurring at the time of her ER visit.  Some dizziness from the medication.  Feels a little more sluggish on the medication.    Due for Tdap update.      Objective    /68   Pulse 66   Temp 98.5  F (36.9  C) (Tympanic)   Resp 18   Wt 104.3 kg (230 lb)   SpO2 98%   BMI 38.27 kg/m    Body mass index is 38.27 kg/m .  Physical Exam   GENERAL: healthy, alert and no distress  NECK: no adenopathy, no asymmetry, masses, or scars and thyroid normal to palpation  RESP: lungs clear to auscultation - no rales, rhonchi or wheezes  CV: regular rate and rhythm, normal S1 S2, no S3 or S4, no murmur, click or rub, no peripheral edema and peripheral pulses strong  ABDOMEN: soft, nontender, no hepatosplenomegaly, no masses and bowel sounds normal  MS: no gross musculoskeletal defects noted, no edema  PSYCH: mentation appears normal, affect normal/bright    Results for orders " placed or performed in visit on 05/30/23   T4, Free     Status: Normal   Result Value Ref Range    Free T4 1.33 0.90 - 1.70 ng/dL   TSH     Status: Normal   Result Value Ref Range    TSH 1.59 0.30 - 4.20 uIU/mL

## 2023-05-30 ENCOUNTER — OFFICE VISIT (OUTPATIENT)
Dept: FAMILY MEDICINE | Facility: OTHER | Age: 33
End: 2023-05-30
Attending: FAMILY MEDICINE
Payer: COMMERCIAL

## 2023-05-30 VITALS
BODY MASS INDEX: 38.27 KG/M2 | HEART RATE: 66 BPM | DIASTOLIC BLOOD PRESSURE: 68 MMHG | TEMPERATURE: 98.5 F | RESPIRATION RATE: 18 BRPM | SYSTOLIC BLOOD PRESSURE: 108 MMHG | OXYGEN SATURATION: 98 % | WEIGHT: 230 LBS

## 2023-05-30 DIAGNOSIS — I49.3 PVC'S (PREMATURE VENTRICULAR CONTRACTIONS): Primary | ICD-10-CM

## 2023-05-30 DIAGNOSIS — Z23 NEED FOR DIPHTHERIA-TETANUS-PERTUSSIS (TDAP) VACCINE: ICD-10-CM

## 2023-05-30 DIAGNOSIS — Z09 FOLLOW-UP EXAMINATION: ICD-10-CM

## 2023-05-30 LAB
T4 FREE SERPL-MCNC: 1.33 NG/DL (ref 0.9–1.7)
TSH SERPL DL<=0.005 MIU/L-ACNC: 1.59 UIU/ML (ref 0.3–4.2)

## 2023-05-30 PROCEDURE — 84443 ASSAY THYROID STIM HORMONE: CPT | Mod: ZL | Performed by: FAMILY MEDICINE

## 2023-05-30 PROCEDURE — 84439 ASSAY OF FREE THYROXINE: CPT | Mod: ZL | Performed by: FAMILY MEDICINE

## 2023-05-30 PROCEDURE — 99213 OFFICE O/P EST LOW 20 MIN: CPT | Performed by: FAMILY MEDICINE

## 2023-05-30 PROCEDURE — 36415 COLL VENOUS BLD VENIPUNCTURE: CPT | Mod: ZL | Performed by: FAMILY MEDICINE

## 2023-05-30 RX ORDER — METOPROLOL SUCCINATE 25 MG/1
12.5 TABLET, EXTENDED RELEASE ORAL DAILY
Qty: 45 TABLET | Refills: 4 | Status: SHIPPED | OUTPATIENT
Start: 2023-05-30 | End: 2024-06-24

## 2023-05-30 ASSESSMENT — PAIN SCALES - GENERAL: PAINLEVEL: NO PAIN (0)

## 2023-05-30 NOTE — NURSING NOTE
"Chief Complaint   Patient presents with     RECHECK     ER follow-up       Initial /68   Pulse 66   Temp 98.5  F (36.9  C) (Tympanic)   Resp 18   Wt 104.3 kg (230 lb)   SpO2 98%   BMI 38.27 kg/m   Estimated body mass index is 38.27 kg/m  as calculated from the following:    Height as of 5/9/23: 1.651 m (5' 5\").    Weight as of this encounter: 104.3 kg (230 lb).  Medication Reconciliation: complete    Emilie Goodrich LPN   [facility] Directive reviewed.     "

## 2023-06-29 ENCOUNTER — OFFICE VISIT (OUTPATIENT)
Dept: FAMILY MEDICINE | Facility: OTHER | Age: 33
End: 2023-06-29
Attending: FAMILY MEDICINE
Payer: COMMERCIAL

## 2023-06-29 VITALS
HEART RATE: 73 BPM | RESPIRATION RATE: 16 BRPM | TEMPERATURE: 97.7 F | BODY MASS INDEX: 38.52 KG/M2 | WEIGHT: 231.2 LBS | HEIGHT: 65 IN | DIASTOLIC BLOOD PRESSURE: 70 MMHG | SYSTOLIC BLOOD PRESSURE: 112 MMHG | OXYGEN SATURATION: 95 %

## 2023-06-29 DIAGNOSIS — H65.93 FLUID LEVEL BEHIND TYMPANIC MEMBRANE OF BOTH EARS: Primary | ICD-10-CM

## 2023-06-29 PROCEDURE — 99213 OFFICE O/P EST LOW 20 MIN: CPT | Performed by: FAMILY MEDICINE

## 2023-06-29 RX ORDER — FLUTICASONE PROPIONATE 50 MCG
2 SPRAY, SUSPENSION (ML) NASAL DAILY
Qty: 15.8 ML | Refills: 1 | Status: SHIPPED | OUTPATIENT
Start: 2023-06-29

## 2023-06-29 ASSESSMENT — PAIN SCALES - GENERAL: PAINLEVEL: NO PAIN (1)

## 2023-06-29 NOTE — PROGRESS NOTES
Nursing Notes:   Samantha Tillman LPN  6/29/2023  1:18 PM  Signed  Chief Complaint   Patient presents with     Otalgia       Medication Reconciliation: complete    Samantha BIANCHI. JEISON Tillman      Subjective     Otalgia (LEFT)  .      5/30/2023     3:25 PM   Additional Questions   Roomed by JEISON Phan   Accompanied by self     History of Present Illness       Reason for visit:  Ear pain  Symptom onset:  1-3 days ago    She eats 2-3 servings of fruits and vegetables daily.She consumes 0 sweetened beverage(s) daily.She exercises with enough effort to increase her heart rate 10 to 19 minutes per day.  She exercises with enough effort to increase her heart rate 3 or less days per week.   She is taking medications regularly.     Shaista is a 33 year old, presenting for the following health issues: Ear pain since Monday. This is following a cold that lasted about two weeks. Has history of seasonal allergies in the last month. Currently has runny nose and productive cough in the morning with clear sputum. Ears feel full, left side opens up when laying on the left side. Pain is most severe in the morning after waking up rates 3/10. Taking Ibuprofen to manage discomfort and this is helping. History of ear infections as a child, no eustachian tubes. No fevers, night sweats, or chills. No drainage out of the ear. Does not use any OTC allergy medications. Is not hearing as well out of left side since this started  Left ear           Review of Systems   Constitutional, HEENT, cardiovascular, pulmonary, GI, , musculoskeletal, neuro, skin, endocrine and psych systems are negative, except as otherwise noted.      Objective    Pulse 73   Temp 97.7  F (36.5  C) (Tympanic)   Wt 104.9 kg (231 lb 3.2 oz)   LMP  (LMP Unknown)   SpO2 95%   Breastfeeding No   BMI 38.47 kg/m    Body mass index is 38.47 kg/m .  Physical Exam  Constitutional:       Appearance: Normal appearance.   HENT:      Head: Normocephalic.      Right Ear: Tympanic  membrane is retracted.      Left Ear: Tympanic membrane is retracted.      Ears:      Comments: Tympanic membrane retracted bilaterally, with fluid on left. Without redness or drainage.     Nose: Congestion and rhinorrhea present.      Mouth/Throat:      Mouth: Mucous membranes are moist.   Eyes:      Extraocular Movements: Extraocular movements intact.      Conjunctiva/sclera: Conjunctivae normal.      Pupils: Pupils are equal, round, and reactive to light.   Cardiovascular:      Rate and Rhythm: Normal rate and regular rhythm.      Heart sounds: No murmur heard.     No friction rub.   Pulmonary:      Effort: Pulmonary effort is normal. No respiratory distress.      Breath sounds: Normal breath sounds. No stridor. No wheezing, rhonchi or rales.   Chest:      Chest wall: No tenderness.   Lymphadenopathy:      Cervical: No cervical adenopathy.   Skin:     General: Skin is warm and dry.   Neurological:      Mental Status: She is alert and oriented to person, place, and time.   Psychiatric:         Mood and Affect: Mood normal.         Behavior: Behavior normal.        Assessment & Plan     ICD-10-CM    1. Fluid level behind tympanic membrane of both ears  H65.93 fluticasone (FLONASE) 50 MCG/ACT nasal spray        1.  She has middle ear effusions, primarily of the left ear.  She has had a cold recently, which I suspect is the etiology.  She also may have some allergies as well.  Recommended use of Flonase nasal spray.  Prescription provided.  Also recommended the consideration of trial of Sudafed over-the-counter.         Encouraged regular exercise.     No follow-ups on file.    India Medina MD  [facility]

## 2023-06-29 NOTE — NURSING NOTE
Chief Complaint   Patient presents with     Otalgia       Medication Reconciliation: complete    Samantha Tillman, LPN

## 2023-08-01 NOTE — PATIENT INSTRUCTIONS
Patient Information     Patient Name MRN Sex Shaista Milan [id number] Female 1990      Patient Instructions by Jeanette Gallardo DC at 2017 10:00 AM     Author:  Jeanette Gallardo DC Service:  (none) Author Type:  [facility]- Other provider     Filed:  2017 11:02 AM Encounter Date:  2017 Status:  Signed     :  Jeanette Gallardo DC ([facility]- Other provider)              INSTRUCTIONS   Continue POC    Read handouts on self-care for Overpronation and Plantar Fascitis.  Daily arch, calf stretching.   Roll arches over frozen water bottle in evening.   Glute strengthening- hip bridges (extension).  Advised on footwear for Overpronation and OTC ankle/arch support brace.    Return 3-4 days.           10/27/2017 Plan of Care: 2017 Updated with Lower extremity care to bi-weekly for up to 4 weeks  Spinal Adjustments, Therapeutic Exercise Activities, and/or Therapeutic Modalities to meet Treatment Goals.     Frequency: 2xweek up to 4 weeks.    Re-eval 17.  POC Discussed and Pt. Agreeable to plan of care.      10/27/2017 Neck index 34% Back index 20%  See outcome assessment tools/indices scanned in chart.     Goals:  Reduce pain and improve Activities of Daily living measured by 10-30% improved index scores  Improve sleep and tolerance to reading, standing/walking by 1 point on indices.          
(V5) oriented

## 2023-12-03 ENCOUNTER — OFFICE VISIT (OUTPATIENT)
Dept: FAMILY MEDICINE | Facility: OTHER | Age: 33
End: 2023-12-03
Payer: COMMERCIAL

## 2023-12-03 VITALS
TEMPERATURE: 97.9 F | SYSTOLIC BLOOD PRESSURE: 118 MMHG | DIASTOLIC BLOOD PRESSURE: 82 MMHG | BODY MASS INDEX: 41.2 KG/M2 | HEART RATE: 69 BPM | WEIGHT: 247.3 LBS | OXYGEN SATURATION: 98 % | HEIGHT: 65 IN | RESPIRATION RATE: 16 BRPM

## 2023-12-03 DIAGNOSIS — J02.9 SORE THROAT: Primary | ICD-10-CM

## 2023-12-03 LAB — GROUP A STREP BY PCR: NOT DETECTED

## 2023-12-03 PROCEDURE — 99213 OFFICE O/P EST LOW 20 MIN: CPT

## 2023-12-03 PROCEDURE — 87651 STREP A DNA AMP PROBE: CPT | Mod: ZL

## 2023-12-03 ASSESSMENT — PAIN SCALES - GENERAL: PAINLEVEL: MILD PAIN (3)

## 2023-12-03 NOTE — PROGRESS NOTES
ASSESSMENT/PLAN:    (J02.9) Sore throat  (primary encounter diagnosis)  Comment: Sore throat for the past 2 days. Fever up to 100.3. No cough or rhinorrhea. No new rashes. No upset stomach.  On exam she is afebrile, she does have exudates and posterior pharynx.  Strep test was obtained and was negative for strep.  At this time I do not recommend antibiotic therapy as strep test is highly accurate.  Likely viral in nature and I recommend symptomatic care.  Plan: Group A Streptococcus PCR Throat Swab  May take over the counter analgesia such as Tylenol for pain or discomfort.  I also recommend salt water gargles, humidifier, throat lozenges if old enough not to be a choking hazard, warm honey if greater than 12 months in age, other home remedies as needed.   If changing or worsening symptoms such as: Worsening fevers, pain, inability to handle own secretions, etc., recommend follow-up.     Discussed warning signs/symptoms indicative of need to f/u    Follow up if symptoms persist or worsen or concerns    I have reviewed the nursing notes.  I have reviewed the findings, diagnosis, plan and need for follow up with the patient.    I explained my diagnostic considerations and recommendations to the patient, who voiced understanding and agreement with the treatment plan. All questions were answered. We discussed potential side effects of any prescribed or recommended therapies, as well as expectations for response to treatments.    MGADA AUSTIN CNP  12/3/2023  3:58 PM    HPI:    Shaista Esqueda is a 33 year old female  who [facility] for concerns of sore throat.    Sore throat for the past 2 days. Fever up to 100.3. No cough or rhinorrhea. No new rashes. No upset stomach.     PCP: Eric    Allergy to cefaclor, amoxicillin, and Bactrim.    No past medical history on file.  Past Surgical History:   Procedure Laterality Date    OTHER SURGICAL HISTORY      58444.0,PAST SURGICAL HISTORY,None  "    Social History     Tobacco Use    Smoking status: Former     Packs/day: 0.25     Years: 8.00     Additional pack years: 0.00     Total pack years: 2.00     Types: Cigarettes    Smokeless tobacco: Never   Substance Use Topics    Alcohol use: Yes     Comment: Alcoholic Drinks/day: Socially     Current Outpatient Medications   Medication Sig Dispense Refill    metoprolol succinate ER (TOPROL XL) 25 MG 24 hr tablet Take 0.5 tablets (12.5 mg) by mouth daily 45 tablet 4    calcium carbonate (TUMS) 500 MG chewable tablet Take 1 chew tab by mouth 2 times daily (Patient not taking: Reported on 12/3/2023)      fluticasone (FLONASE) 50 MCG/ACT nasal spray Spray 2 sprays into both nostrils daily (Patient not taking: Reported on 12/3/2023) 15.8 mL 1    ibuprofen (ADVIL/MOTRIN) 600 MG tablet Take 1 tablet (600 mg) by mouth every 6 hours as needed for moderate pain (Patient not taking: Reported on 12/3/2023) 100 tablet 1    metoprolol tartrate (LOPRESSOR) 25 MG tablet Take 1 tablet (25 mg) by mouth 2 times daily for 30 days 60 tablet 0     Allergies   Allergen Reactions    Amoxicillin Hives    Cefaclor Unknown    Sulfamethoxazole-Trimethoprim Unknown     Past medical history, past surgical history, current medications and allergies reviewed and accurate to the best of my knowledge.      ROS:  Refer to HPI    /82   Pulse 69   Temp 97.9  F (36.6  C) (Tympanic)   Resp 16   Ht 1.651 m (5' 5\")   Wt 112.2 kg (247 lb 4.8 oz)   SpO2 98%   Breastfeeding No   BMI 41.15 kg/m      EXAM:  General Appearance: Well appearing 33 year old female, appropriate appearance for age. No acute distress   Ears: Left TM intact, translucent with bony landmarks appreciated, no erythema, no effusion, no bulging, no purulence.  Right TM intact, translucent with bony landmarks appreciated, no erythema, no effusion, no bulging, no purulence.  Left auditory canal clear.  Right auditory canal clear.  Normal external ears, non tender.  Eyes: " conjunctivae normal without erythema or irritation, corneas clear, no drainage or crusting, no eyelid swelling, pupils equal   Oropharynx: moist mucous membranes, posterior pharynx with erythema, with exudates, no petechiae, no post nasal drip seen, no trismus, voice clear.    Nose:  Bilateral nares: no erythema, no edema, no drainage or congestion   Neck: supple without adenopathy  Respiratory: normal chest wall and respirations.  Normal effort.  Clear to auscultation bilaterally, no wheezing, crackles or rhonchi.  No increased work of breathing.  No cough appreciated.  Cardiac: RRR with no murmurs  Musculoskeletal:  Equal movement of bilateral upper extremities.  Equal movement of bilateral lower extremities.  Normal gait.    Dermatological: no rashes noted of exposed skin  Neuro: Alert and oriented to person, place, and time.  Cranial nerves II-XII grossly intact with no focal or lateralizing deficits.  Muscle tone normal.  Gait normal. No tremor.   Psychological: normal affect, alert, oriented, and pleasant.     Labs:  Results for orders placed or performed in visit on 12/03/23   Group A Streptococcus PCR Throat Swab     Status: Normal    Specimen: Throat; Swab   Result Value Ref Range    Group A strep by PCR Not Detected Not Detected    Narrative    The Xpert Xpress Strep A test, performed on the [employer], is a rapid, qualitative in vitro diagnostic test for the detection of Streptococcus pyogenes (Group A ß-hemolytic Streptococcus, Strep A) in throat swab specimens from patients with signs and symptoms of pharyngitis. The Xpert Xpress Strep A test can be used as an aid in the diagnosis of Group A Streptococcal pharyngitis. The assay is not intended to monitor treatment for Group A Streptococcus infections. The Xpert Xpress Strep A test utilizes an automated real-time polymerase chain reaction (PCR) to detect Streptococcus pyogenes DNA.

## 2023-12-03 NOTE — NURSING NOTE
"Chief Complaint   Patient presents with    Pharyngitis       Initial /82   Pulse 69   Temp 97.9  F (36.6  C) (Tympanic)   Resp 16   Ht 1.651 m (5' 5\")   Wt 112.2 kg (247 lb 4.8 oz)   SpO2 98%   Breastfeeding No   BMI 41.15 kg/m   Estimated body mass index is 41.15 kg/m  as calculated from the following:    Height as of this encounter: 1.651 m (5' 5\").    Weight as of this encounter: 112.2 kg (247 lb 4.8 oz).  Medication Review: complete    The next two questions are to help us understand your food security.  If you are feeling you need any assistance in this area, we have resources available to support you today.          12/3/2023   SDOH- Food Insecurity   Within the past 12 months, did you worry that your food would run out before you got money to buy more? N   Within the past 12 months, did the food you bought just not last and you didn t have money to get more? N         Health Care Directive:  Patient does not have a Health Care Directive or Living Will: Discussed advance care planning with patient; however, patient declined at this time.    Samantha Mckeon, BERNARDO      "

## 2023-12-17 ENCOUNTER — MYC REFILL (OUTPATIENT)
Dept: FAMILY MEDICINE | Facility: OTHER | Age: 33
End: 2023-12-17
Payer: COMMERCIAL

## 2023-12-17 DIAGNOSIS — I49.3 PVC'S (PREMATURE VENTRICULAR CONTRACTIONS): ICD-10-CM

## 2023-12-17 RX ORDER — METOPROLOL SUCCINATE 25 MG/1
12.5 TABLET, EXTENDED RELEASE ORAL DAILY
Qty: 45 TABLET | Refills: 4 | Status: CANCELLED | OUTPATIENT
Start: 2023-12-17

## 2023-12-20 NOTE — TELEPHONE ENCOUNTER
metoprolol succinate ER (TOPROL XL) 25 MG 24 hr tablet 45 tablet 4 5/30/2023 -- --   Sig - Route: Take 0.5 tablets (12.5 mg) by mouth daily - Oral   Sent to pharmacy as: Metoprolol Succinate ER 25 MG Oral Tablet Extended Release 24 Hour (TOPROL XL)   Class: E-Prescribe   Order: [id number]   E-Prescribing Status: Receipt confirmed by pharmacy (5/30/2023  3:51 PM CDT)     [facility] - [place], [place] - [address] [place] [address] [address]     Pt informed of prescription via [employer]. Maude Perez RN .............. 12/20/2023  3:46 PM

## 2024-05-01 ENCOUNTER — E-VISIT (OUTPATIENT)
Dept: URGENT CARE | Facility: CLINIC | Age: 34
End: 2024-05-01
Payer: COMMERCIAL

## 2024-05-01 DIAGNOSIS — N39.0 ACUTE UTI (URINARY TRACT INFECTION): Primary | ICD-10-CM

## 2024-05-01 PROCEDURE — 99421 OL DIG E/M SVC 5-10 MIN: CPT | Performed by: PHYSICIAN ASSISTANT

## 2024-05-01 RX ORDER — NITROFURANTOIN 25; 75 MG/1; MG/1
100 CAPSULE ORAL 2 TIMES DAILY
Qty: 10 CAPSULE | Refills: 0 | Status: SHIPPED | OUTPATIENT
Start: 2024-05-01 | End: 2024-05-06

## 2024-05-01 NOTE — PATIENT INSTRUCTIONS
Dear Shaista Esqueda    After reviewing your responses, I've been able to diagnose you with a urinary tract infection, which is a common infection of the bladder with bacteria.  This is not a sexually transmitted infection, though urinating immediately after intercourse can help prevent infections.  Drinking lots of fluids is also helpful to clear your current infection and prevent the next one.      I have sent a prescription for antibiotics to your pharmacy to treat this infection.    It is important that you take all of your prescribed medication even if your symptoms are improving after a few doses.  Taking all of your medicine helps prevent the symptoms from returning.     If your symptoms worsen, you develop pain in your back or stomach, develop fevers, or are not improving in 5 days, please contact your primary care provider for an appointment or visit any of our convenient Walk-in or Urgent Care Centers to be seen, which can be found on our website here.    Thanks again for choosing us as your health care partner,    Zelalem Reed PA-C

## 2024-06-21 DIAGNOSIS — I49.3 PVC'S (PREMATURE VENTRICULAR CONTRACTIONS): ICD-10-CM

## 2024-06-24 ENCOUNTER — MYC REFILL (OUTPATIENT)
Dept: FAMILY MEDICINE | Facility: OTHER | Age: 34
End: 2024-06-24
Payer: COMMERCIAL

## 2024-06-24 ENCOUNTER — MYC MEDICAL ADVICE (OUTPATIENT)
Dept: FAMILY MEDICINE | Facility: OTHER | Age: 34
End: 2024-06-24
Payer: COMMERCIAL

## 2024-06-24 DIAGNOSIS — I49.3 PVC'S (PREMATURE VENTRICULAR CONTRACTIONS): ICD-10-CM

## 2024-06-24 RX ORDER — METOPROLOL SUCCINATE 25 MG/1
TABLET, EXTENDED RELEASE ORAL
Qty: 45 TABLET | Refills: 4 | Status: SHIPPED | OUTPATIENT
Start: 2024-06-24

## 2024-06-24 RX ORDER — METOPROLOL SUCCINATE 25 MG/1
12.5 TABLET, EXTENDED RELEASE ORAL DAILY
Qty: 45 TABLET | Refills: 4 | OUTPATIENT
Start: 2024-06-24

## 2024-06-24 NOTE — TELEPHONE ENCOUNTER
[facility] sent Rx request for the following:      Requested Prescriptions   Pending Prescriptions Disp Refills    metoprolol succinate ER (TOPROL XL) 25 MG 24 hr tablet [Pharmacy Med Name: metoprolol succinate ER 25 mg tablet,extended release 24 hr] 45 tablet 4     Sig: TAKE 1/2 TABLET (12.5 MG) BY MOUTH DAILY       Beta-Blockers Protocol Passed - 6/21/2024  7:38 AM        Passed - Blood pressure under 140/90 in past 12 months     BP Readings from Last 3 Encounters:   12/03/23 118/82   06/29/23 112/70   05/30/23 108/68       No data recorded            Passed - Patient is age 6 or older        Passed - Medication is active on med list        Passed - Medication indicated for associated diagnosis     Medication is associated with one or more of the following diagnoses:     Hypertension (HTN)   Atrial fibrillation/flutter   Angina   ASCVD   Migraine   Heart Failure   Tremor   Anxiety   Ocular hypertension   Glaucoma          Passed - Recent (12 mo) or future (90 days) visit within the authorizing provider's specialty     The patient must have completed an in-person or virtual visit within the past 12 months or has a future visit scheduled within the next 90 days with the authorizing provider s specialty.  Urgent care and e-visits do not quality as an office visit for this protocol.               Last Prescription Date:   5/30/23  Last Fill Qty/Refills:         45, R-4    Last Office Visit:              5/30/23   Future Office visit:           none    Prescription approved per [facility] Refill Protocol.  Radha Loya RN on 6/24/2024 at 1:02 PM

## 2024-10-15 NOTE — PATIENT INSTRUCTIONS
Patient Information     Patient Name MRN Sex Shaista Milan [id number] Female 1990      Patient Instructions by Jeanette Gallardo DC at 10/27/2017 11:00 AM     Author:  Jeanette Gallardo DC Service:  (none) Author Type:  [facility]- Other provider     Filed:  2017 12:58 PM Encounter Date:  10/27/2017 Status:  Signed     :  Jeanette Gallardo DC ([facility]- Other provider)              INSTRUCTIONS   Return 3-4 days.  Restart exercise routine.       10/27/2017 Plan of Care:   Spinal Adjustments, Therapeutic Exercise Activities, and/or Therapeutic Modalities to meet Treatment Goals.     Frequency: every 1-2 weeks up to 4 weeks.    Re-eval in 2 weeks approximately 17.  POC Discussed and Pt. Agreeable to plan of care.      10/27/2017 Neck index 34% Back index 20%  See outcome assessment tools/indices scanned in chart.     Goals:  Reduce pain and improve Activities of Daily living measured by 10-30% improved index scores  Improve sleep and tolerance to reading, standing/walking by 1 point on indices.           Detail Level: Zone Detail Level: Detailed

## 2025-04-07 ENCOUNTER — OFFICE VISIT (OUTPATIENT)
Dept: FAMILY MEDICINE | Facility: OTHER | Age: 35
End: 2025-04-07
Attending: STUDENT IN AN ORGANIZED HEALTH CARE EDUCATION/TRAINING PROGRAM
Payer: COMMERCIAL

## 2025-04-07 VITALS
OXYGEN SATURATION: 98 % | HEIGHT: 65 IN | HEART RATE: 77 BPM | BODY MASS INDEX: 42.15 KG/M2 | TEMPERATURE: 97.2 F | WEIGHT: 253 LBS | SYSTOLIC BLOOD PRESSURE: 134 MMHG | RESPIRATION RATE: 18 BRPM | DIASTOLIC BLOOD PRESSURE: 88 MMHG

## 2025-04-07 DIAGNOSIS — K21.00 GASTROESOPHAGEAL REFLUX DISEASE WITH ESOPHAGITIS WITHOUT HEMORRHAGE: Primary | ICD-10-CM

## 2025-04-07 PROCEDURE — 99213 OFFICE O/P EST LOW 20 MIN: CPT | Performed by: NURSE PRACTITIONER

## 2025-04-07 RX ORDER — FAMOTIDINE 40 MG/1
40 TABLET, FILM COATED ORAL 2 TIMES DAILY
Qty: 60 TABLET | Refills: 0 | Status: SHIPPED | OUTPATIENT
Start: 2025-04-07

## 2025-04-07 RX ORDER — SUCRALFATE ORAL 1 G/10ML
1 SUSPENSION ORAL 2 TIMES DAILY PRN
Qty: 414 ML | Refills: 0 | Status: SHIPPED | OUTPATIENT
Start: 2025-04-07

## 2025-04-07 ASSESSMENT — ENCOUNTER SYMPTOMS
NEUROLOGICAL NEGATIVE: 1
BLOOD IN STOOL: 0
RESPIRATORY NEGATIVE: 1
CONSTITUTIONAL NEGATIVE: 1
MUSCULOSKELETAL NEGATIVE: 1
ABDOMINAL PAIN: 1
HEMATOLOGIC/LYMPHATIC NEGATIVE: 1
PSYCHIATRIC NEGATIVE: 1
DIARRHEA: 0
CONSTIPATION: 0
CARDIOVASCULAR NEGATIVE: 1
VOMITING: 0
NAUSEA: 0
ROS GI COMMENTS: HEARTBURN
EYES NEGATIVE: 1

## 2025-04-07 ASSESSMENT — PAIN SCALES - GENERAL: PAINLEVEL_OUTOF10: NO PAIN (0)

## 2025-04-07 NOTE — PROGRESS NOTES
Shaista Isabelolivia  1990    ASSESSMENT/PLAN    Presents to [facility] with worsening acid reflux.  Patient states she has had acid reflux intermittently for many years, has recently used Tums, Rolaids, omeprazole without significant relief.  Patient states this has significantly worsened in the last couple weeks.  No new lifestyle changes, dietary changes.  Patient did quit smoking.  Patient does drink alcohol, usually on weekends.  Will trial Pepcid 40 mg twice a day and Carafate for symptoms. Patient's vitals are stable and she appears nontoxic.        1. Gastroesophageal reflux disease with esophagitis without hemorrhage (Primary)    - famotidine (PEPCID) 40 MG tablet; Take 1 tablet (40 mg) by mouth 2 times daily.  Dispense: 60 tablet; Refill: 0  - sucralfate (CARAFATE) 1 GM/10ML suspension; Take 10 mLs (1 g) by mouth 2 times daily as needed for nausea.  Dispense: 414 mL; Refill: 0  - May use over-the-counter Tylenol or ibuprofen PRN  - Follow up as needed for new or worsening symptoms        *A shared decision making model was used.   *Patient and/or associated parties understood and were agreeable to treatment plan.   *Plan and all results were discussed.   *Explanation of diagnosis, treatment options and risk and benefits of medications reviewed with patient.    *Time was taken to answer all questions.   *Red flags symptoms were discussed and patient was advised when they should return for reevaluation or for prompt emergency evaluation.   *Patient was given verbal and written instructions on plan of care. Instructions were printed or are available on [employer] on electronic AVS.   *We discussed potential side effects of any prescribed or recommended therapies, as well as expectations for response to treatments.  *Patient discharged in stable condition    Priscilla Aguilar CNP  [facility]    SUBJECTIVE  CHIEF COMPLAINT/ REASON FOR VISIT  Patient presents with:  Gastrophageal Reflux: 2  "months, worse in past 2-3 weeks     HISTORY OF PRESENT ILLNESS  Shaista Esqueda is a pleasant 34 year old female [facility] with worsening acid reflux.  Patient states she has had acid reflux intermittently for many years, has used Tums, Rolaids, omeprazole without significant relief.  Patient states this has significantly worsened in the last couple weeks.  No new lifestyle changes, dietary changes.  Patient did quit smoking.  Patient does drink alcohol, usually on weekends.         I have reviewed the nursing notes.  I have reviewed allergies, medication list, problem list, and past medical history.    REVIEW OF SYSTEMS  Review of Systems   Constitutional: Negative.    HENT: Negative.     Eyes: Negative.    Respiratory: Negative.     Cardiovascular: Negative.    Gastrointestinal:  Positive for abdominal pain. Negative for blood in stool, constipation, diarrhea, nausea and vomiting.        Heartburn   Genitourinary: Negative.    Musculoskeletal: Negative.    Skin: Negative.    Neurological: Negative.    Hematological: Negative.    Psychiatric/Behavioral: Negative.     All other systems reviewed and are negative.       VITAL SIGNS  Vitals:    04/07/25 1543   BP: 134/88   BP Location: Right arm   Patient Position: Sitting   Cuff Size: Adult Regular   Pulse: 77   Resp: 18   Temp: 97.2  F (36.2  C)   TempSrc: Temporal   SpO2: 98%   Weight: 114.8 kg (253 lb)   Height: 1.651 m (5' 5\")      Body mass index is 42.1 kg/m .      OBJECTIVE  PHYSICAL EXAM  Physical Exam  Vitals and nursing note reviewed.   Constitutional:       Appearance: Normal appearance.   HENT:      Head: Normocephalic.      Nose: Nose normal.      Mouth/Throat:      Mouth: Mucous membranes are moist.   Cardiovascular:      Rate and Rhythm: Normal rate.      Pulses: Normal pulses.   Pulmonary:      Effort: Pulmonary effort is normal.   Abdominal:      Palpations: Abdomen is soft.   Musculoskeletal:         General: Normal range of " motion.   Skin:     General: Skin is warm and dry.      Capillary Refill: Capillary refill takes less than 2 seconds.   Neurological:      General: No focal deficit present.      Mental Status: She is alert.

## 2025-04-07 NOTE — NURSING NOTE
"Chief Complaint   Patient presents with    Gastrophageal Reflux     2 months, worse in past 2-3 weeks   [facility] concerns of acid reflux. Patient reports symptoms X2 months but getting worse in the past 2-3 weeks.     Initial /88 (BP Location: Right arm, Patient Position: Sitting, Cuff Size: Adult Regular)   Pulse 77   Temp 97.2  F (36.2  C) (Temporal)   Resp 18   Ht 1.651 m (5' 5\")   Wt 114.8 kg (253 lb)   SpO2 98%   BMI 42.10 kg/m   Estimated body mass index is 42.1 kg/m  as calculated from the following:    Height as of this encounter: 1.651 m (5' 5\").    Weight as of this encounter: 114.8 kg (253 lb).  Medication Review: complete    The next two questions are to help us understand your food security.  If you are feeling you need any assistance in this area, we have resources available to support you today.          4/7/2025   SDOH- Food Insecurity   Within the past 12 months, did you worry that your food would run out before you got money to buy more? N   Within the past 12 months, did the food you bought just not last and you didn t have money to get more? N         Health Care Directive:  Patient does not have a Health Care Directive: Discussed advance care planning with patient; however, patient declined at this time.    Damion Singh      "

## 2025-04-14 ENCOUNTER — TELEPHONE (OUTPATIENT)
Dept: FAMILY MEDICINE | Facility: OTHER | Age: 35
End: 2025-04-14
Payer: COMMERCIAL

## 2025-04-14 NOTE — TELEPHONE ENCOUNTER
I tried submitting a prior authorization request for Sucralfate suspension and this is non-formulary. Would you be willing to change order to Sucralfate tablets instead? Thank you!  Vi Horowitz on 4/14/2025 at 12:06 PM

## 2025-05-16 DIAGNOSIS — K21.00 GASTROESOPHAGEAL REFLUX DISEASE WITH ESOPHAGITIS WITHOUT HEMORRHAGE: ICD-10-CM

## 2025-05-19 RX ORDER — FAMOTIDINE 40 MG/1
40 TABLET, FILM COATED ORAL 2 TIMES DAILY
Qty: 180 TABLET | Refills: 2 | Status: SHIPPED | OUTPATIENT
Start: 2025-05-19

## 2025-05-19 NOTE — TELEPHONE ENCOUNTER
[facility], [place] -[address]: sent Rx request for the following:      Requested Prescriptions   Pending Prescriptions Disp Refills    famotidine (PEPCID) 40 MG tablet 60 tablet 0     Sig: Take 1 tablet (40 mg) by mouth 2 times daily.       H2 Blockers Protocol Passed - 5/19/2025  2:58 PM      Last Prescription Date:   4/7/2025  Last Fill Qty/Refills:         60, R-0    Last Office Visit:              4/7/2025   Future Office visit:           5/27/2025      Prescription approved per [facility] Refill Protocol.  Ricardo Singh RN on 5/19/2025 at 3:00 PM

## 2025-05-22 NOTE — PROGRESS NOTES
"  Assessment & Plan     1. Gastroesophageal reflux disease with esophagitis without hemorrhage (Primary)  Chronic, discussed options of continued management, evaluation of gallbladder, EGD.  She would like to continue as is for now and I agree this is acceptable due to significant improvement in symptoms with Pepcid and Carafate (was only using at bedtime for a period of time).   Discussed medications in detail today; and will renew.  Aware that if symptoms worsen/progress -- she will reach out to me and will plan to proceed with RUQ US (+ HIDA scan if normal); or referral to Gen Surgery to consider EGD or other options.  - famotidine (PEPCID) 40 MG tablet; Take 1 tablet (40 mg) by mouth 2 times daily as needed for heartburn.  Dispense: 180 tablet; Refill: 4  - sucralfate (CARAFATE) 1 GM tablet; Take 1 tablet (1 g) by mouth 4 times daily.  Dispense: 120 tablet; Refill: 5    2. Need for Tdap vaccination  Updated today!  - TDAP 7+ (ADACEL,BOOSTRIX)      MDM:  Prescription drug management      BMI  Estimated body mass index is 41.93 kg/m  as calculated from the following:    Height as of this encounter: 1.651 m (5' 5\").    Weight as of this encounter: 114.3 kg (252 lb).   Weight management plan: Discussed healthy diet and exercise guidelines    Follow up: pawan Teague   Shaista is a 34 year old, presenting for the following health issues:  Gastrophageal Reflux        5/27/2025     4:06 PM   Additional Questions   Roomed by JEISON Phan   Accompanied by self     History of Present Illness       Reason for visit:  Acid reflux    She eats 2-3 servings of fruits and vegetables daily.She consumes 0 sweetened beverage(s) daily.She exercises with enough effort to increase her heart rate 10 to 19 minutes per day.  She exercises with enough effort to increase her heart rate 3 or less days per week.   She is taking medications regularly.      Was seen in the  on 4/7/25 for increasing symptoms of acid reflux.  Has " "previously had intermittently for years; with trying Tums, Rolaids, omeprazole - all without significant relief. No lifestyle changes; no dietary changes.  Quit smoking.  Does drink alcohol on weekends.  Has cut back a lot.  Used to use NSAID (was being used for migraines, but has had less since smoking cessation).  Worsened mid-end of March 2025.  Was ultimately placed on Pepcid and Carafate.  Here in follow up to above.    Since that time: improved  Typical meals --  Breakfast: banana; two boiled eggs  Lunch: varies, well balanced.  Chick pea salad  Dinner: typical foods, balanced.   Snacks: rare (popcorn, cheese stick)  Beverages: some coffee, water, bubbl'r occasionally.    Usually stays away from red sauce.  Avoids late at night eating.  Reduced coffee intake.  Over-eating, spicy foods, fatty foods make things worse.  Occasionally pain/uncomfortable; but not often.  Symptoms are at least 80% improved.  Has been seeing the chiropractor for it.        Objective    /84   Pulse 83   Temp 98.3  F (36.8  C) (Tympanic)   Resp 16   Ht 1.651 m (5' 5\")   Wt 114.3 kg (252 lb)   LMP  (LMP Unknown)   SpO2 97%   BMI 41.93 kg/m    Body mass index is 41.93 kg/m .  Physical Exam   GENERAL: alert and no distress  EYES: Eyes grossly normal to inspection, PERRL and conjunctivae and sclerae normal  HENT: ear canals and TM's normal, nose and mouth without ulcers or lesions  NECK: no adenopathy, no asymmetry, masses, or scars  RESP: lungs clear to auscultation - no rales, rhonchi or wheezes  CV: regular rate and rhythm, normal S1 S2, no S3 or S4, no murmur, click or rub, no peripheral edema  ABDOMEN: soft, nontender, no hepatosplenomegaly, no masses and bowel sounds normal  MS: no gross musculoskeletal defects noted, no edema  SKIN: no suspicious lesions or rashes  PSYCH: mentation appears normal, affect normal/bright    No results found for any visits on 05/27/25.        Signed Electronically by: Saray Reyes, " DO

## 2025-05-27 ENCOUNTER — OFFICE VISIT (OUTPATIENT)
Dept: FAMILY MEDICINE | Facility: OTHER | Age: 35
End: 2025-05-27
Attending: FAMILY MEDICINE
Payer: COMMERCIAL

## 2025-05-27 VITALS
BODY MASS INDEX: 41.99 KG/M2 | RESPIRATION RATE: 16 BRPM | DIASTOLIC BLOOD PRESSURE: 84 MMHG | SYSTOLIC BLOOD PRESSURE: 114 MMHG | WEIGHT: 252 LBS | OXYGEN SATURATION: 97 % | HEIGHT: 65 IN | HEART RATE: 83 BPM | TEMPERATURE: 98.3 F

## 2025-05-27 DIAGNOSIS — Z23 NEED FOR TDAP VACCINATION: ICD-10-CM

## 2025-05-27 DIAGNOSIS — K21.00 GASTROESOPHAGEAL REFLUX DISEASE WITH ESOPHAGITIS WITHOUT HEMORRHAGE: Primary | ICD-10-CM

## 2025-05-27 RX ORDER — SUCRALFATE 1 G/1
1 TABLET ORAL 4 TIMES DAILY
Qty: 120 TABLET | Refills: 5 | Status: SHIPPED | OUTPATIENT
Start: 2025-05-27

## 2025-05-27 RX ORDER — FAMOTIDINE 40 MG/1
40 TABLET, FILM COATED ORAL 2 TIMES DAILY PRN
Qty: 180 TABLET | Refills: 4 | Status: SHIPPED | OUTPATIENT
Start: 2025-05-27

## 2025-05-27 ASSESSMENT — PAIN SCALES - GENERAL: PAINLEVEL_OUTOF10: NO PAIN (0)

## 2025-05-27 NOTE — NURSING NOTE
"Chief Complaint   Patient presents with    Gastrophageal Reflux       Initial /84   Pulse 83   Temp 98.3  F (36.8  C) (Tympanic)   Resp 16   Ht 1.651 m (5' 5\")   Wt 114.3 kg (252 lb)   LMP  (LMP Unknown)   SpO2 97%   BMI 41.93 kg/m   Estimated body mass index is 41.93 kg/m  as calculated from the following:    Height as of this encounter: 1.651 m (5' 5\").    Weight as of this encounter: 114.3 kg (252 lb).  Medication Review: complete    The next two questions are to help us understand your food security.  If you are feeling you need any assistance in this area, we have resources available to support you today.          4/7/2025   SDOH- Food Insecurity   Within the past 12 months, did you worry that your food would run out before you got money to buy more? N   Within the past 12 months, did the food you bought just not last and you didn t have money to get more? N         Health Care Directive:  Patient does not have a Health Care Directive: Discussed advance care planning with patient; however, patient declined at this time.    Emilie Goodrich LPN      "

## 2025-08-29 DIAGNOSIS — I49.3 PVC'S (PREMATURE VENTRICULAR CONTRACTIONS): ICD-10-CM

## 2025-09-04 RX ORDER — METOPROLOL SUCCINATE 25 MG/1
12.5 TABLET, EXTENDED RELEASE ORAL DAILY
Qty: 45 TABLET | Refills: 4 | Status: SHIPPED | OUTPATIENT
Start: 2025-09-04

## (undated) RX ORDER — FAMOTIDINE 20 MG/1
TABLET, FILM COATED ORAL
Status: DISPENSED
Start: 2018-07-30

## (undated) RX ORDER — CIPROFLOXACIN 500 MG/1
TABLET, FILM COATED ORAL
Status: DISPENSED
Start: 2018-12-15

## (undated) RX ORDER — METOPROLOL TARTRATE 25 MG/1
TABLET, FILM COATED ORAL
Status: DISPENSED
Start: 2023-05-09

## (undated) RX ORDER — ALUMINA, MAGNESIA, AND SIMETHICONE 2400; 2400; 240 MG/30ML; MG/30ML; MG/30ML
SUSPENSION ORAL
Status: DISPENSED
Start: 2018-07-30